# Patient Record
Sex: MALE | Race: WHITE | NOT HISPANIC OR LATINO | Employment: FULL TIME | URBAN - NONMETROPOLITAN AREA
[De-identification: names, ages, dates, MRNs, and addresses within clinical notes are randomized per-mention and may not be internally consistent; named-entity substitution may affect disease eponyms.]

---

## 2021-09-14 ENCOUNTER — HOSPITAL ENCOUNTER (EMERGENCY)
Facility: HOSPITAL | Age: 56
Discharge: HOME | End: 2021-09-15
Attending: EMERGENCY MEDICINE | Admitting: EMERGENCY MEDICINE

## 2021-09-14 DIAGNOSIS — R03.0 ELEVATED BLOOD PRESSURE READING: Primary | ICD-10-CM

## 2021-09-14 PROCEDURE — 85025 COMPLETE CBC W/AUTO DIFF WBC: CPT | Performed by: EMERGENCY MEDICINE

## 2021-09-14 PROCEDURE — 99283 EMERGENCY DEPT VISIT LOW MDM: CPT

## 2021-09-14 PROCEDURE — 93005 ELECTROCARDIOGRAM TRACING: CPT

## 2021-09-14 PROCEDURE — 93005 ELECTROCARDIOGRAM TRACING: CPT | Performed by: EMERGENCY MEDICINE

## 2021-09-14 PROCEDURE — 80053 COMPREHEN METABOLIC PANEL: CPT | Performed by: EMERGENCY MEDICINE

## 2021-09-14 RX ORDER — SODIUM CHLORIDE 0.9 % (FLUSH) 0.9 %
10 SYRINGE (ML) INJECTION AS NEEDED
Status: DISCONTINUED | OUTPATIENT
Start: 2021-09-14 | End: 2021-09-15 | Stop reason: HOSPADM

## 2021-09-15 VITALS
WEIGHT: 210 LBS | RESPIRATION RATE: 16 BRPM | BODY MASS INDEX: 26.95 KG/M2 | DIASTOLIC BLOOD PRESSURE: 101 MMHG | TEMPERATURE: 97.8 F | OXYGEN SATURATION: 98 % | HEIGHT: 74 IN | HEART RATE: 61 BPM | SYSTOLIC BLOOD PRESSURE: 149 MMHG

## 2021-09-15 LAB
ALBUMIN SERPL-MCNC: 5.1 G/DL (ref 3.5–5.2)
ALBUMIN/GLOB SERPL: 2 G/DL
ALP SERPL-CCNC: 77 U/L (ref 39–117)
ALT SERPL W P-5'-P-CCNC: 18 U/L (ref 1–41)
ANION GAP SERPL CALCULATED.3IONS-SCNC: 10.7 MMOL/L (ref 5–15)
AST SERPL-CCNC: 23 U/L (ref 1–40)
BASOPHILS # BLD AUTO: 0.1 10*3/MM3 (ref 0–0.2)
BASOPHILS NFR BLD AUTO: 1.5 % (ref 0–1.5)
BILIRUB SERPL-MCNC: 0.6 MG/DL (ref 0–1.2)
BUN SERPL-MCNC: 11 MG/DL (ref 6–20)
BUN/CREAT SERPL: 11.3 (ref 7–25)
CALCIUM SPEC-SCNC: 9.6 MG/DL (ref 8.6–10.5)
CHLORIDE SERPL-SCNC: 102 MMOL/L (ref 98–107)
CO2 SERPL-SCNC: 27.3 MMOL/L (ref 22–29)
CREAT SERPL-MCNC: 0.97 MG/DL (ref 0.76–1.27)
DEPRECATED RDW RBC AUTO: 39.5 FL (ref 37–54)
EOSINOPHIL # BLD AUTO: 0.26 10*3/MM3 (ref 0–0.4)
EOSINOPHIL NFR BLD AUTO: 3.8 % (ref 0.3–6.2)
ERYTHROCYTE [DISTWIDTH] IN BLOOD BY AUTOMATED COUNT: 12.5 % (ref 12.3–15.4)
GFR SERPL CREATININE-BSD FRML MDRD: 79 ML/MIN/1.73
GLOBULIN UR ELPH-MCNC: 2.5 GM/DL
GLUCOSE SERPL-MCNC: 95 MG/DL (ref 65–99)
HCT VFR BLD AUTO: 44.4 % (ref 37.5–51)
HGB BLD-MCNC: 15.2 G/DL (ref 13–17.7)
HOLD SPECIMEN: NORMAL
HOLD SPECIMEN: NORMAL
IMM GRANULOCYTES # BLD AUTO: 0.02 10*3/MM3 (ref 0–0.05)
IMM GRANULOCYTES NFR BLD AUTO: 0.3 % (ref 0–0.5)
LYMPHOCYTES # BLD AUTO: 1.33 10*3/MM3 (ref 0.7–3.1)
LYMPHOCYTES NFR BLD AUTO: 19.4 % (ref 19.6–45.3)
MCH RBC QN AUTO: 29.4 PG (ref 26.6–33)
MCHC RBC AUTO-ENTMCNC: 34.2 G/DL (ref 31.5–35.7)
MCV RBC AUTO: 85.9 FL (ref 79–97)
MONOCYTES # BLD AUTO: 0.48 10*3/MM3 (ref 0.1–0.9)
MONOCYTES NFR BLD AUTO: 7 % (ref 5–12)
NEUTROPHILS NFR BLD AUTO: 4.67 10*3/MM3 (ref 1.7–7)
NEUTROPHILS NFR BLD AUTO: 68 % (ref 42.7–76)
NRBC BLD AUTO-RTO: 0 /100 WBC (ref 0–0.2)
PLATELET # BLD AUTO: 259 10*3/MM3 (ref 140–450)
PMV BLD AUTO: 9.3 FL (ref 6–12)
POTASSIUM SERPL-SCNC: 4 MMOL/L (ref 3.5–5.2)
PROT SERPL-MCNC: 7.6 G/DL (ref 6–8.5)
RBC # BLD AUTO: 5.17 10*6/MM3 (ref 4.14–5.8)
SODIUM SERPL-SCNC: 140 MMOL/L (ref 136–145)
WBC # BLD AUTO: 6.86 10*3/MM3 (ref 3.4–10.8)
WHOLE BLOOD HOLD SPECIMEN: NORMAL
WHOLE BLOOD HOLD SPECIMEN: NORMAL

## 2021-09-15 RX ORDER — LISINOPRIL 10 MG/1
10 TABLET ORAL DAILY
Qty: 30 TABLET | Refills: 0 | Status: SHIPPED | OUTPATIENT
Start: 2021-09-15 | End: 2022-05-31

## 2021-09-15 NOTE — DISCHARGE INSTRUCTIONS
Please keep a blood pressure diary.  We did send a prescription for lisinopril to your pharmacy, 1 month supply.  You should follow-up with your family doctor in a week or 2 to reevaluate your blood pressure.  You may not need the blood pressure medication if your blood pressures are back to your baseline.  Your goal is 120/80.  If they are consistently elevated take the blood pressure medication.  Your labs and EKG are reassuring here today.

## 2021-09-18 NOTE — ED PROVIDER NOTES
Subjective   History of Present Illness    Chief Complaint: Dizziness elevated blood pressure reading  History of Present Illness: 56-year-old male felt as though his blood pressure was elevated, checked it again and it was high, states he is not currently on any blood pressure medications and his blood pressure usually runs 120/80.  Onset: This evening  Duration: 2 hours prior, persistent  Exacerbating / Alleviating factors: None  Associated symptoms: None      Nurses Notes reviewed and agree, including vitals, allergies, social history and prior medical history.     REVIEW OF SYSTEMS: All systems reviewed and not pertinent unless noted.    Positive for: Dizzy, elevated blood pressure.    Negative for: Headache confusion chest pain palpitations abdominal pain  Review of Systems    Past Medical History:   Diagnosis Date   • Gallstones    • GERD (gastroesophageal reflux disease)    • Hyperlipidemia        No Known Allergies    Past Surgical History:   Procedure Laterality Date   • KNEE SURGERY Right 1979   • MANDIBLE SURGERY  2006   • TONSILLECTOMY  1986       Family History   Problem Relation Age of Onset   • Cancer Mother         lymphoma   • Heart disease Father         MI   • Hypertension Sister    • Hyperlipidemia Sister    • Hypertension Brother    • Hyperlipidemia Brother    • Stroke Paternal Grandmother        Social History     Socioeconomic History   • Marital status: Single     Spouse name: Not on file   • Number of children: Not on file   • Years of education: Not on file   • Highest education level: Not on file   Tobacco Use   • Smoking status: Never Smoker   • Smokeless tobacco: Never Used   Substance and Sexual Activity   • Alcohol use: Yes     Alcohol/week: 4.0 standard drinks     Types: 4 Standard drinks or equivalent per week   • Drug use: No   • Sexual activity: Defer           Objective   Physical Exam    CONSTITUTIONAL: Well developed, nontoxic healthy-appearing 56-year-old  male,  in no  acute distress.  VITAL SIGNS: per nursing, reviewed and noted  SKIN: exposed skin with no rashes, ulcerations or petechiae.  EYES: perrla. EOMI.  ENT: Normal voice.  Patient maintained wearing a mask throughout patient encounter due to coronavirus pandemic  RESPIRATORY:  No increased work of breathing. No retractions.  Chest clear auscultation bilaterally  CARDIOVASCULAR:  regular rate and rhythm, no murmurs.  Good Peripheral pulses. Good cap refill to extremities.   GI: Abdomen soft, nontender, normal bowel sounds. No hernia. No ascites.  MUSCULOSKELETAL:  No tenderness. Full ROM. Strength and tone grossly normal.  no spasms. no neck or back tenderness or spasm.   NEUROLOGIC: Alert, oriented x 3. No gross deficits. GCS 15.  NIH stroke scale 0.  PSYCH: appropriate affect.  : no bladder tenderness or distention, no CVA tenderness      Procedures     No attending physician procedures were performed on this patient.      ED Course  ED Course as of Sep 18 0755   Wed Sep 15, 2021   0705 EKG interpreted by me reveals sinus bradycardia rate of 58.  No ectopy no ischemic changes normal EKG    [PF]   Sat Sep 18, 2021   0753 WBC: 6.86 [PF]   0753 Hemoglobin: 15.2 [PF]   0753 Hematocrit: 44.4 [PF]   0753 Platelets: 259 [PF]   0753 Glucose: 95 [PF]   0753 BUN: 11 [PF]   0754 Creatinine: 0.97 [PF]   0754 Sodium: 140 [PF]   0754 Potassium: 4.0 [PF]   0754 Chloride: 102 [PF]   0754 CO2: 27.3 [PF]   0754 Calcium: 9.6 [PF]   0754 Total Protein: 7.6 [PF]   0754 Albumin: 5.10 [PF]   0754 ALT (SGPT): 18 [PF]   0754 AST (SGOT): 23 [PF]   0754 Alkaline Phosphatase: 77 [PF]   0754 Total Bilirubin: 0.6 [PF]      ED Course User Index  [PF] Jemal Hatfield, DO                                           MDM  56-year-old male presented with elevated blood pressure readings at home, associated dizziness.  Denies any chest pain at any time.  Did have elevated blood pressure reading 188/110 on arrival here.  No tachycardia no hypoxia.  Blood  pressure improved spontaneously 149/101.  Patient declined head CT imaging.    Prescription lisinopril, advised blood pressure diary outpatient follow-up return precautions were discussed.    Final diagnoses:   Elevated blood pressure reading       ED Disposition  ED Disposition     ED Disposition Condition Comment    Discharge Stable           Whitney Soto MD  [address] [facility] [place] [address] [phone]          [facility] Emergency Department  [address] [facility] [address] [phone]    As needed, If symptoms worsen         Medication List      New Prescriptions    lisinopril 10 MG tablet  Commonly known as: PRINIVIL,ZESTRIL  Take 1 tablet by mouth Daily.           Where to Get Your Medications      These medications were sent to [facility] [address] [place] [address] - [address] [facility] - [phone]  - [phone] [address] [address]    Phone: [phone]   · lisinopril 10 MG tablet          Jemal Hatfield DO  09/18/21 0755

## 2021-09-27 ENCOUNTER — HOSPITAL ENCOUNTER (EMERGENCY)
Facility: HOSPITAL | Age: 56
Discharge: HOME | End: 2021-09-28
Attending: EMERGENCY MEDICINE | Admitting: EMERGENCY MEDICINE

## 2021-09-27 DIAGNOSIS — I10 HYPERTENSION, UNSPECIFIED TYPE: Primary | ICD-10-CM

## 2021-09-27 DIAGNOSIS — R07.89 CHEST TIGHTNESS: ICD-10-CM

## 2021-09-27 DIAGNOSIS — R10.84 GENERALIZED ABDOMINAL PAIN: ICD-10-CM

## 2021-09-27 LAB
BASOPHILS # BLD AUTO: 0.09 10*3/MM3 (ref 0–0.2)
BASOPHILS NFR BLD AUTO: 1.2 % (ref 0–1.5)
BILIRUB UR QL STRIP: NEGATIVE
CLARITY UR: CLEAR
COLOR UR: YELLOW
DEPRECATED RDW RBC AUTO: 38.9 FL (ref 37–54)
EOSINOPHIL # BLD AUTO: 0.23 10*3/MM3 (ref 0–0.4)
EOSINOPHIL NFR BLD AUTO: 3.1 % (ref 0.3–6.2)
ERYTHROCYTE [DISTWIDTH] IN BLOOD BY AUTOMATED COUNT: 12.3 % (ref 12.3–15.4)
GLUCOSE UR STRIP-MCNC: NEGATIVE MG/DL
HCT VFR BLD AUTO: 43.9 % (ref 37.5–51)
HGB BLD-MCNC: 15.1 G/DL (ref 13–17.7)
HGB UR QL STRIP.AUTO: NEGATIVE
IMM GRANULOCYTES # BLD AUTO: 0.03 10*3/MM3 (ref 0–0.05)
IMM GRANULOCYTES NFR BLD AUTO: 0.4 % (ref 0–0.5)
KETONES UR QL STRIP: NEGATIVE
LEUKOCYTE ESTERASE UR QL STRIP.AUTO: NEGATIVE
LYMPHOCYTES # BLD AUTO: 1.41 10*3/MM3 (ref 0.7–3.1)
LYMPHOCYTES NFR BLD AUTO: 19.1 % (ref 19.6–45.3)
MCH RBC QN AUTO: 29.7 PG (ref 26.6–33)
MCHC RBC AUTO-ENTMCNC: 34.4 G/DL (ref 31.5–35.7)
MCV RBC AUTO: 86.2 FL (ref 79–97)
MONOCYTES # BLD AUTO: 0.58 10*3/MM3 (ref 0.1–0.9)
MONOCYTES NFR BLD AUTO: 7.8 % (ref 5–12)
NEUTROPHILS NFR BLD AUTO: 5.06 10*3/MM3 (ref 1.7–7)
NEUTROPHILS NFR BLD AUTO: 68.4 % (ref 42.7–76)
NITRITE UR QL STRIP: NEGATIVE
NRBC BLD AUTO-RTO: 0 /100 WBC (ref 0–0.2)
PH UR STRIP.AUTO: >=9 [PH] (ref 5–8)
PLATELET # BLD AUTO: 272 10*3/MM3 (ref 140–450)
PMV BLD AUTO: 9.5 FL (ref 6–12)
PROT UR QL STRIP: ABNORMAL
RBC # BLD AUTO: 5.09 10*6/MM3 (ref 4.14–5.8)
SP GR UR STRIP: 1.02 (ref 1–1.03)
UROBILINOGEN UR QL STRIP: ABNORMAL
WBC # BLD AUTO: 7.4 10*3/MM3 (ref 3.4–10.8)

## 2021-09-27 PROCEDURE — 84484 ASSAY OF TROPONIN QUANT: CPT | Performed by: EMERGENCY MEDICINE

## 2021-09-27 PROCEDURE — 99283 EMERGENCY DEPT VISIT LOW MDM: CPT

## 2021-09-27 PROCEDURE — 81003 URINALYSIS AUTO W/O SCOPE: CPT | Performed by: EMERGENCY MEDICINE

## 2021-09-27 PROCEDURE — 36415 COLL VENOUS BLD VENIPUNCTURE: CPT

## 2021-09-27 PROCEDURE — 80053 COMPREHEN METABOLIC PANEL: CPT | Performed by: EMERGENCY MEDICINE

## 2021-09-27 PROCEDURE — 85025 COMPLETE CBC W/AUTO DIFF WBC: CPT | Performed by: EMERGENCY MEDICINE

## 2021-09-27 PROCEDURE — 83690 ASSAY OF LIPASE: CPT | Performed by: EMERGENCY MEDICINE

## 2021-09-27 PROCEDURE — 93005 ELECTROCARDIOGRAM TRACING: CPT | Performed by: EMERGENCY MEDICINE

## 2021-09-28 VITALS
DIASTOLIC BLOOD PRESSURE: 83 MMHG | WEIGHT: 205 LBS | OXYGEN SATURATION: 96 % | HEART RATE: 75 BPM | RESPIRATION RATE: 18 BRPM | SYSTOLIC BLOOD PRESSURE: 132 MMHG | HEIGHT: 74 IN | TEMPERATURE: 98.1 F | BODY MASS INDEX: 26.31 KG/M2

## 2021-09-28 LAB
ALBUMIN SERPL-MCNC: 5.1 G/DL (ref 3.5–5.2)
ALBUMIN/GLOB SERPL: 2.4 G/DL
ALP SERPL-CCNC: 72 U/L (ref 39–117)
ALT SERPL W P-5'-P-CCNC: 15 U/L (ref 1–41)
ANION GAP SERPL CALCULATED.3IONS-SCNC: 10.2 MMOL/L (ref 5–15)
AST SERPL-CCNC: 21 U/L (ref 1–40)
BILIRUB SERPL-MCNC: 0.8 MG/DL (ref 0–1.2)
BUN SERPL-MCNC: 15 MG/DL (ref 6–20)
BUN/CREAT SERPL: 14.6 (ref 7–25)
CALCIUM SPEC-SCNC: 9.6 MG/DL (ref 8.6–10.5)
CHLORIDE SERPL-SCNC: 103 MMOL/L (ref 98–107)
CO2 SERPL-SCNC: 29.8 MMOL/L (ref 22–29)
CREAT SERPL-MCNC: 1.03 MG/DL (ref 0.76–1.27)
GFR SERPL CREATININE-BSD FRML MDRD: 74 ML/MIN/1.73
GLOBULIN UR ELPH-MCNC: 2.1 GM/DL
GLUCOSE SERPL-MCNC: 126 MG/DL (ref 65–99)
LIPASE SERPL-CCNC: 63 U/L (ref 13–60)
POTASSIUM SERPL-SCNC: 4.2 MMOL/L (ref 3.5–5.2)
PROT SERPL-MCNC: 7.2 G/DL (ref 6–8.5)
SODIUM SERPL-SCNC: 143 MMOL/L (ref 136–145)
TROPONIN T SERPL-MCNC: <0.01 NG/ML (ref 0–0.03)

## 2021-10-12 ENCOUNTER — TRANSCRIBE ORDERS (OUTPATIENT)
Dept: ADMINISTRATIVE | Facility: HOSPITAL | Age: 56
End: 2021-10-12

## 2021-10-12 DIAGNOSIS — K40.90 INGUINAL HERNIA, RIGHT: Primary | ICD-10-CM

## 2021-10-14 ENCOUNTER — HOSPITAL ENCOUNTER (OUTPATIENT)
Dept: ULTRASOUND IMAGING | Facility: HOSPITAL | Age: 56
Discharge: HOME | End: 2021-10-14
Admitting: SURGERY

## 2021-10-14 DIAGNOSIS — K40.90 INGUINAL HERNIA, RIGHT: ICD-10-CM

## 2021-10-14 PROCEDURE — 76882 US LMTD JT/FCL EVL NVASC XTR: CPT

## 2021-10-25 ENCOUNTER — TRANSCRIBE ORDERS (OUTPATIENT)
Dept: LAB | Facility: HOSPITAL | Age: 56
End: 2021-10-25

## 2021-10-25 ENCOUNTER — OFFICE VISIT (OUTPATIENT)
Dept: GASTROENTEROLOGY | Facility: CLINIC | Age: 56
End: 2021-10-25

## 2021-10-25 ENCOUNTER — LAB (OUTPATIENT)
Dept: LAB | Facility: HOSPITAL | Age: 56
End: 2021-10-25

## 2021-10-25 VITALS
BODY MASS INDEX: 25.15 KG/M2 | HEIGHT: 74 IN | TEMPERATURE: 97.5 F | SYSTOLIC BLOOD PRESSURE: 127 MMHG | RESPIRATION RATE: 16 BRPM | DIASTOLIC BLOOD PRESSURE: 78 MMHG | WEIGHT: 196 LBS | HEART RATE: 65 BPM

## 2021-10-25 DIAGNOSIS — K21.9 GASTROESOPHAGEAL REFLUX DISEASE WITHOUT ESOPHAGITIS: Chronic | ICD-10-CM

## 2021-10-25 DIAGNOSIS — Z01.818 PRE-OPERATIVE CLEARANCE: ICD-10-CM

## 2021-10-25 DIAGNOSIS — Z11.59 ENCOUNTER FOR SCREENING FOR OTHER VIRAL DISEASES: ICD-10-CM

## 2021-10-25 DIAGNOSIS — K80.20 GALLSTONES: Chronic | ICD-10-CM

## 2021-10-25 DIAGNOSIS — R10.13 EPIGASTRIC PAIN: Chronic | ICD-10-CM

## 2021-10-25 DIAGNOSIS — Z86.010 PERSONAL HISTORY OF COLONIC POLYPS: Primary | ICD-10-CM

## 2021-10-25 DIAGNOSIS — Z01.818 PRE-OPERATIVE CLEARANCE: Primary | ICD-10-CM

## 2021-10-25 PROBLEM — Z86.0100 PERSONAL HISTORY OF COLONIC POLYPS: Status: ACTIVE | Noted: 2021-10-25

## 2021-10-25 PROBLEM — I10 HYPERTENSIVE DISORDER: Status: ACTIVE | Noted: 2021-10-04

## 2021-10-25 LAB — SARS-COV-2 RNA PNL SPEC NAA+PROBE: NOT DETECTED

## 2021-10-25 PROCEDURE — C9803 HOPD COVID-19 SPEC COLLECT: HCPCS

## 2021-10-25 PROCEDURE — 99204 OFFICE O/P NEW MOD 45 MIN: CPT | Performed by: NURSE PRACTITIONER

## 2021-10-25 PROCEDURE — U0004 COV-19 TEST NON-CDC HGH THRU: HCPCS

## 2021-10-25 RX ORDER — FAMOTIDINE 20 MG/1
20 TABLET, FILM COATED ORAL AS NEEDED
COMMUNITY
End: 2022-05-31 | Stop reason: SDUPTHER

## 2021-10-25 RX ORDER — SODIUM CHLORIDE 9 MG/ML
70 INJECTION, SOLUTION INTRAVENOUS CONTINUOUS PRN
Status: CANCELLED | OUTPATIENT
Start: 2021-10-25

## 2021-10-25 RX ORDER — ASPIRIN 325 MG
325 TABLET ORAL AS NEEDED
COMMUNITY

## 2021-10-25 RX ORDER — OMEPRAZOLE 20 MG/1
20 CAPSULE, DELAYED RELEASE ORAL DAILY
COMMUNITY
End: 2022-05-31 | Stop reason: SDUPTHER

## 2021-10-25 NOTE — PATIENT INSTRUCTIONS
Antireflux measures: Avoid fried, fatty foods, alcohol, chocolate, coffee, tea,  soft drinks, peppermint and spearmint, spicy foods, tomatoes and tomato based foods, onions, peppers, and smoking.   Other antireflux measures include weight reduction if overweight, avoiding tight clothing around the abdomen, elevating the head of the bed 6 inches with blocks under the head board, and don't drink or eat before going to bed and avoid lying down immediately after meals.  Omeprazole 20 mg 1 by mouth in the am 30 minutes before breakfast.   Colonoscopy: The indications, technique, alternatives and potential risk and complications were discussed with the patient including but not limited to bleeding, perforations, missing lesions and anesthetic complications. The patient understands and wishes to proceed with the procedure and has given their verbal consent. Written patient education information was given to the patient.   The patient will call if they have further questions before procedure.

## 2021-10-25 NOTE — PROGRESS NOTES
New Patient Consult      Date: 10/25/2021   Patient Name: Benedict A Enzweiler  MRN: [id number]  : 1965     Primary Care Provider: Whitney Soto MD    Chief Complaint   Patient presents with   • Colon Cancer Screening     History of Present Illness: Benedict A Enzweiler is a 56 y.o. male who is here today to establish care with Gastroenterology for colon cancer screening.     The patient denies constipation, diarrhea or rectal bleeding. He has a history of epigastric pain off and on for several years that been occurring more frequently than in the past. He has the pain after he eats that will radiate to his back. He occasionally wakes at night due to discomfort in the epigastric area.  Eating fatty foods make his pain worse. No significant nausea.     He has a history of reflux for a few years. Every time he eats he has reflux. He denies reflux at night. He is taking Omeprazole 20 mg daily with reasonable control typically. If reflux is severe, he takes a Pepcid as needed, which helps. No difficulty swallowing.     His last colonoscopy was in  with polyp removed by Dr. Mnoroe, surgical services. He has not had an EGD in the past. There is no family history of GI malignancy.     Subjective      Past Medical History:   Diagnosis Date   • Colon polyp 2016   • Gallstones    • GERD (gastroesophageal reflux disease)    • HTN (hypertension)    • Hyperlipidemia      Past Surgical History:   Procedure Laterality Date   • COLONOSCOPY  2016   • KNEE SURGERY Right    • MANDIBLE SURGERY     • TONSILLECTOMY       Family History   Problem Relation Age of Onset   • Cancer Mother         lymphoma   • Ulcers Mother    • Heart disease Father         MI   • Hypertension Sister    • Hyperlipidemia Sister    • Hypertension Brother    • Hyperlipidemia Brother    • Stroke Paternal Grandmother    • Colon cancer Neg Hx      Social History     Socioeconomic History   • Marital status: Single   Tobacco  Use   • Smoking status: Never Smoker   • Smokeless tobacco: Never Used   Vaping Use   • Vaping Use: Never used   Substance and Sexual Activity   • Alcohol use: Yes     Alcohol/week: 4.0 standard drinks     Types: 4 Standard drinks or equivalent per week     Comment: occ   • Drug use: No   • Sexual activity: Defer       Current Outpatient Medications:   •  aspirin 325 MG tablet, Take 325 mg by mouth As Needed for Mild Pain ., Disp: , Rfl:   •  famotidine (PEPCID) 20 MG tablet, Take 20 mg by mouth As Needed for Heartburn., Disp: , Rfl:   •  lisinopril (PRINIVIL,ZESTRIL) 10 MG tablet, Take 1 tablet by mouth Daily., Disp: 30 tablet, Rfl: 0  •  omeprazole (priLOSEC) 20 MG capsule, Take 20 mg by mouth Daily., Disp: , Rfl:     No Known Allergies     The following portions of the patient's history were reviewed and updated as appropriate: allergies, current medications, past family history, past medical history, past social history, past surgical history and problem list.    Objective     Physical Exam  Vitals and nursing note reviewed.   Constitutional:       General: He is not in acute distress.     Appearance: Normal appearance. He is well-developed.   HENT:      Head: Normocephalic and atraumatic.      Right Ear: Hearing normal.      Left Ear: Hearing normal.      Mouth/Throat:      Mouth: Mucous membranes are not pale, not dry and not cyanotic.   Eyes:      General: Lids are normal.      Conjunctiva/sclera: Conjunctivae normal.   Neck:      Trachea: Trachea normal.   Cardiovascular:      Rate and Rhythm: Normal rate and regular rhythm.      Heart sounds: Normal heart sounds.   Pulmonary:      Effort: Pulmonary effort is normal. No respiratory distress.      Breath sounds: Normal breath sounds.   Abdominal:      General: Bowel sounds are normal.      Palpations: Abdomen is soft. There is no mass.      Tenderness: There is no abdominal tenderness.      Hernia: No hernia is present.   Skin:     General: Skin is warm and  "dry.   Neurological:      Mental Status: He is alert and oriented to person, place, and time.   Psychiatric:         Mood and Affect: Mood normal.         Speech: Speech normal.         Behavior: Behavior normal. Behavior is cooperative.       Vitals:    10/25/21 0838   BP: 127/78   Pulse: 65   Resp: 16   Temp: 97.5 °F (36.4 °C)   Weight: 88.9 kg (196 lb)   Height: 188 cm (74\")     Body mass index is 25.16 kg/m².     Results Review:   I have reviewed the patient's new clinical and imaging results.    Admission on 09/27/2021, Discharged on 09/28/2021   Component Date Value Ref Range Status   • Glucose 09/27/2021 126* 65 - 99 mg/dL Final   • BUN 09/27/2021 15  6 - 20 mg/dL Final   • Creatinine 09/27/2021 1.03  0.76 - 1.27 mg/dL Final   • Sodium 09/27/2021 143  136 - 145 mmol/L Final   • Potassium 09/27/2021 4.2  3.5 - 5.2 mmol/L Final   • Chloride 09/27/2021 103  98 - 107 mmol/L Final   • CO2 09/27/2021 29.8* 22.0 - 29.0 mmol/L Final   • Calcium 09/27/2021 9.6  8.6 - 10.5 mg/dL Final   • Total Protein 09/27/2021 7.2  6.0 - 8.5 g/dL Final   • Albumin 09/27/2021 5.10  3.50 - 5.20 g/dL Final   • ALT (SGPT) 09/27/2021 15  1 - 41 U/L Final   • AST (SGOT) 09/27/2021 21  1 - 40 U/L Final   • Alkaline Phosphatase 09/27/2021 72  39 - 117 U/L Final   • Total Bilirubin 09/27/2021 0.8  0.0 - 1.2 mg/dL Final   • eGFR Non  Amer 09/27/2021 74  >60 mL/min/1.73 Final   • Globulin 09/27/2021 2.1  gm/dL Final   • A/G Ratio 09/27/2021 2.4  g/dL Final   • BUN/Creatinine Ratio 09/27/2021 14.6  7.0 - 25.0 Final   • Anion Gap 09/27/2021 10.2  5.0 - 15.0 mmol/L Final   • Lipase 09/27/2021 63* 13 - 60 U/L Final   • Color, UA 09/27/2021 Yellow  Yellow, Straw Final   • Appearance, UA 09/27/2021 Clear  Clear Final   • pH, UA 09/27/2021 >=9.0* 5.0 - 8.0 Final   • Specific Gravity, UA 09/27/2021 1.020  1.005 - 1.030 Final   • Glucose, UA 09/27/2021 Negative  Negative Final   • Ketones, UA 09/27/2021 Negative  Negative Final   • Bilirubin, UA " 09/27/2021 Negative  Negative Final   • Blood, UA 09/27/2021 Negative  Negative Final   • Protein, UA 09/27/2021 Trace* Negative Final   • Leuk Esterase, UA 09/27/2021 Negative  Negative Final   • Nitrite, UA 09/27/2021 Negative  Negative Final   • Urobilinogen, UA 09/27/2021 0.2 E.U./dL  0.2 - 1.0 E.U./dL Final   • Troponin T 09/27/2021 <0.010  0.000 - 0.030 ng/mL Final   • WBC 09/27/2021 7.40  3.40 - 10.80 10*3/mm3 Final   • RBC 09/27/2021 5.09  4.14 - 5.80 10*6/mm3 Final   • Hemoglobin 09/27/2021 15.1  13.0 - 17.7 g/dL Final   • Hematocrit 09/27/2021 43.9  37.5 - 51.0 % Final   • MCV 09/27/2021 86.2  79.0 - 97.0 fL Final   • MCH 09/27/2021 29.7  26.6 - 33.0 pg Final   • MCHC 09/27/2021 34.4  31.5 - 35.7 g/dL Final   • RDW 09/27/2021 12.3  12.3 - 15.4 % Final   • RDW-SD 09/27/2021 38.9  37.0 - 54.0 fl Final   • MPV 09/27/2021 9.5  6.0 - 12.0 fL Final   • Platelets 09/27/2021 272  140 - 450 10*3/mm3 Final   • Neutrophil % 09/27/2021 68.4  42.7 - 76.0 % Final   • Lymphocyte % 09/27/2021 19.1* 19.6 - 45.3 % Final   • Monocyte % 09/27/2021 7.8  5.0 - 12.0 % Final   • Eosinophil % 09/27/2021 3.1  0.3 - 6.2 % Final   • Basophil % 09/27/2021 1.2  0.0 - 1.5 % Final   • Immature Grans % 09/27/2021 0.4  0.0 - 0.5 % Final   • Neutrophils, Absolute 09/27/2021 5.06  1.70 - 7.00 10*3/mm3 Final   • Lymphocytes, Absolute 09/27/2021 1.41  0.70 - 3.10 10*3/mm3 Final   • Monocytes, Absolute 09/27/2021 0.58  0.10 - 0.90 10*3/mm3 Final   • Eosinophils, Absolute 09/27/2021 0.23  0.00 - 0.40 10*3/mm3 Final   • Basophils, Absolute 09/27/2021 0.09  0.00 - 0.20 10*3/mm3 Final   • Immature Grans, Absolute 09/27/2021 0.03  0.00 - 0.05 10*3/mm3 Final   • nRBC 09/27/2021 0.0  0.0 - 0.2 /100 WBC Final   Admission on 09/14/2021, Discharged on 09/15/2021   Component Date Value Ref Range Status   • Extra Tube 09/14/2021 Hold for add-ons.   Final    Auto resulted.   • Extra Tube 09/14/2021 hold for add-on   Final    Auto resulted   • Extra Tube  09/14/2021 Hold for add-ons.   Final    Auto resulted.   • Extra Tube 09/14/2021 hold for add-on   Final    Auto resulted   • Glucose 09/14/2021 95  65 - 99 mg/dL Final   • BUN 09/14/2021 11  6 - 20 mg/dL Final   • Creatinine 09/14/2021 0.97  0.76 - 1.27 mg/dL Final   • Sodium 09/14/2021 140  136 - 145 mmol/L Final   • Potassium 09/14/2021 4.0  3.5 - 5.2 mmol/L Final   • Chloride 09/14/2021 102  98 - 107 mmol/L Final   • CO2 09/14/2021 27.3  22.0 - 29.0 mmol/L Final   • Calcium 09/14/2021 9.6  8.6 - 10.5 mg/dL Final   • Total Protein 09/14/2021 7.6  6.0 - 8.5 g/dL Final   • Albumin 09/14/2021 5.10  3.50 - 5.20 g/dL Final   • ALT (SGPT) 09/14/2021 18  1 - 41 U/L Final   • AST (SGOT) 09/14/2021 23  1 - 40 U/L Final   • Alkaline Phosphatase 09/14/2021 77  39 - 117 U/L Final   • Total Bilirubin 09/14/2021 0.6  0.0 - 1.2 mg/dL Final   • eGFR Non  Amer 09/14/2021 79  >60 mL/min/1.73 Final   • Globulin 09/14/2021 2.5  gm/dL Final   • A/G Ratio 09/14/2021 2.0  g/dL Final   • BUN/Creatinine Ratio 09/14/2021 11.3  7.0 - 25.0 Final   • Anion Gap 09/14/2021 10.7  5.0 - 15.0 mmol/L Final   • WBC 09/14/2021 6.86  3.40 - 10.80 10*3/mm3 Final   • RBC 09/14/2021 5.17  4.14 - 5.80 10*6/mm3 Final   • Hemoglobin 09/14/2021 15.2  13.0 - 17.7 g/dL Final   • Hematocrit 09/14/2021 44.4  37.5 - 51.0 % Final   • MCV 09/14/2021 85.9  79.0 - 97.0 fL Final   • MCH 09/14/2021 29.4  26.6 - 33.0 pg Final   • MCHC 09/14/2021 34.2  31.5 - 35.7 g/dL Final   • RDW 09/14/2021 12.5  12.3 - 15.4 % Final   • RDW-SD 09/14/2021 39.5  37.0 - 54.0 fl Final   • MPV 09/14/2021 9.3  6.0 - 12.0 fL Final   • Platelets 09/14/2021 259  140 - 450 10*3/mm3 Final   • Neutrophil % 09/14/2021 68.0  42.7 - 76.0 % Final   • Lymphocyte % 09/14/2021 19.4* 19.6 - 45.3 % Final   • Monocyte % 09/14/2021 7.0  5.0 - 12.0 % Final   • Eosinophil % 09/14/2021 3.8  0.3 - 6.2 % Final   • Basophil % 09/14/2021 1.5  0.0 - 1.5 % Final   • Immature Grans % 09/14/2021 0.3  0.0 - 0.5  % Final   • Neutrophils, Absolute 09/14/2021 4.67  1.70 - 7.00 10*3/mm3 Final   • Lymphocytes, Absolute 09/14/2021 1.33  0.70 - 3.10 10*3/mm3 Final   • Monocytes, Absolute 09/14/2021 0.48  0.10 - 0.90 10*3/mm3 Final   • Eosinophils, Absolute 09/14/2021 0.26  0.00 - 0.40 10*3/mm3 Final   • Basophils, Absolute 09/14/2021 0.10  0.00 - 0.20 10*3/mm3 Final   • Immature Grans, Absolute 09/14/2021 0.02  0.00 - 0.05 10*3/mm3 Final   • nRBC 09/14/2021 0.0  0.0 - 0.2 /100 WBC Final      US Nonvascular Extremity Limited     Result Date: 10/15/2021  Normal appearing lymph node in the area of interest felt by the patient.   D:  10/15/2021 E:  10/15/2021  This report was finalized on 10/15/2021 4:23 PM by Dr. Izabela Veloz MD.      Colonoscopy per Dr. Monroe dated 9/16/2016 with polyp removed. Hepatic flexure polyp biopsy with tubular adenoma without dysplasia.    Assessment / Plan      1. Personal history of colonic polyps  Patient's last colonoscopy was in 2016 by Dr. Monroe, surgical services, with polyp removed, tubular adenoma without dysplasia.  There is no family history of colon cancer.  Colonoscopy for surveillance.    - Case Request; Standing  - Case Request    2. Epigastric pain  3. Gallstones  4. Gastroesophageal reflux disease without esophagitis  Patient has a history of epigastric pain off and on for several years that has been occurring more frequently than in the past.  He has the pain after eating that radiates to his back.  He occasionally wakes at night with discomfort in the epigastric pain.  No significant nausea.  No melena. He was told several years ago he had gallstones.  He has a history of reflux for a few years that is reasonably controlled with omeprazole 20 mg daily.  Occasionally if reflux is severe, he takes Pepcid as needed.  No difficulty swallowing. Basic labs unremarkable. Lipase borderline elevated at 63, not consistent with pancreatitis.  Antireflux measures.  Omeprazole 20 mg 1 p.o.  daily.  EGD in the future if no improvement or symptoms worsen.    Patient Instructions   Antireflux measures: Avoid fried, fatty foods, alcohol, chocolate, coffee, tea,  soft drinks, peppermint and spearmint, spicy foods, tomatoes and tomato based foods, onions, peppers, and smoking.   Other antireflux measures include weight reduction if overweight, avoiding tight clothing around the abdomen, elevating the head of the bed 6 inches with blocks under the head board, and don't drink or eat before going to bed and avoid lying down immediately after meals.  Omeprazole 20 mg 1 by mouth in the am 30 minutes before breakfast.   Colonoscopy: The indications, technique, alternatives and potential risk and complications were discussed with the patient including but not limited to bleeding, perforations, missing lesions and anesthetic complications. The patient understands and wishes to proceed with the procedure and has given their verbal consent. Written patient education information was given to the patient.   The patient will call if they have further questions before procedure.      Ora Daly, APRN  10/25/2021    Please note that portions of this note may have been completed with a voice recognition program. Efforts were made to edit the dictations, but occasionally words are mistranscribed.

## 2021-10-27 ENCOUNTER — LAB REQUISITION (OUTPATIENT)
Dept: LAB | Facility: HOSPITAL | Age: 56
End: 2021-10-27

## 2021-10-27 DIAGNOSIS — Z01.818 ENCOUNTER FOR OTHER PREPROCEDURAL EXAMINATION: ICD-10-CM

## 2021-10-27 DIAGNOSIS — K80.20 CALCULUS OF GALLBLADDER WITHOUT CHOLECYSTITIS WITHOUT OBSTRUCTION: ICD-10-CM

## 2021-10-27 LAB
ALBUMIN SERPL-MCNC: 4.8 G/DL (ref 3.5–5.2)
ALBUMIN/GLOB SERPL: 1.8 G/DL
ALP SERPL-CCNC: 88 U/L (ref 39–117)
ALT SERPL W P-5'-P-CCNC: 24 U/L (ref 1–41)
ANION GAP SERPL CALCULATED.3IONS-SCNC: 12 MMOL/L (ref 5–15)
AST SERPL-CCNC: 22 U/L (ref 1–40)
BASOPHILS # BLD AUTO: 0.08 10*3/MM3 (ref 0–0.2)
BASOPHILS NFR BLD AUTO: 1.3 % (ref 0–1.5)
BILIRUB SERPL-MCNC: 0.7 MG/DL (ref 0–1.2)
BUN SERPL-MCNC: 16 MG/DL (ref 6–20)
BUN/CREAT SERPL: 17.6 (ref 7–25)
CALCIUM SPEC-SCNC: 9.8 MG/DL (ref 8.6–10.5)
CHLORIDE SERPL-SCNC: 102 MMOL/L (ref 98–107)
CO2 SERPL-SCNC: 26 MMOL/L (ref 22–29)
CREAT SERPL-MCNC: 0.91 MG/DL (ref 0.76–1.27)
DEPRECATED RDW RBC AUTO: 39.2 FL (ref 37–54)
EOSINOPHIL # BLD AUTO: 0.39 10*3/MM3 (ref 0–0.4)
EOSINOPHIL NFR BLD AUTO: 6.2 % (ref 0.3–6.2)
ERYTHROCYTE [DISTWIDTH] IN BLOOD BY AUTOMATED COUNT: 12.2 % (ref 12.3–15.4)
GFR SERPL CREATININE-BSD FRML MDRD: 86 ML/MIN/1.73
GLOBULIN UR ELPH-MCNC: 2.6 GM/DL
GLUCOSE SERPL-MCNC: 146 MG/DL (ref 65–99)
HCT VFR BLD AUTO: 44.3 % (ref 37.5–51)
HGB BLD-MCNC: 14.8 G/DL (ref 13–17.7)
IMM GRANULOCYTES # BLD AUTO: 0.03 10*3/MM3 (ref 0–0.05)
IMM GRANULOCYTES NFR BLD AUTO: 0.5 % (ref 0–0.5)
LYMPHOCYTES # BLD AUTO: 1.35 10*3/MM3 (ref 0.7–3.1)
LYMPHOCYTES NFR BLD AUTO: 21.6 % (ref 19.6–45.3)
MCH RBC QN AUTO: 29.1 PG (ref 26.6–33)
MCHC RBC AUTO-ENTMCNC: 33.4 G/DL (ref 31.5–35.7)
MCV RBC AUTO: 87 FL (ref 79–97)
MONOCYTES # BLD AUTO: 0.51 10*3/MM3 (ref 0.1–0.9)
MONOCYTES NFR BLD AUTO: 8.1 % (ref 5–12)
NEUTROPHILS NFR BLD AUTO: 3.9 10*3/MM3 (ref 1.7–7)
NEUTROPHILS NFR BLD AUTO: 62.3 % (ref 42.7–76)
NRBC BLD AUTO-RTO: 0 /100 WBC (ref 0–0.2)
PLATELET # BLD AUTO: 248 10*3/MM3 (ref 140–450)
PMV BLD AUTO: 10.3 FL (ref 6–12)
POTASSIUM SERPL-SCNC: 3.7 MMOL/L (ref 3.5–5.2)
PROT SERPL-MCNC: 7.4 G/DL (ref 6–8.5)
RBC # BLD AUTO: 5.09 10*6/MM3 (ref 4.14–5.8)
SODIUM SERPL-SCNC: 140 MMOL/L (ref 136–145)
WBC # BLD AUTO: 6.26 10*3/MM3 (ref 3.4–10.8)

## 2021-10-27 PROCEDURE — 80053 COMPREHEN METABOLIC PANEL: CPT | Performed by: SURGERY

## 2021-10-27 PROCEDURE — 88304 TISSUE EXAM BY PATHOLOGIST: CPT | Performed by: SURGERY

## 2021-10-27 PROCEDURE — 85025 COMPLETE CBC W/AUTO DIFF WBC: CPT | Performed by: SURGERY

## 2021-10-28 LAB
CYTO UR: NORMAL
LAB AP CASE REPORT: NORMAL
LAB AP CLINICAL INFORMATION: NORMAL
PATH REPORT.FINAL DX SPEC: NORMAL
PATH REPORT.GROSS SPEC: NORMAL

## 2021-10-29 ENCOUNTER — APPOINTMENT (OUTPATIENT)
Dept: ULTRASOUND IMAGING | Facility: HOSPITAL | Age: 56
End: 2021-10-29

## 2021-12-01 ENCOUNTER — TELEPHONE (OUTPATIENT)
Dept: GASTROENTEROLOGY | Facility: CLINIC | Age: 56
End: 2021-12-01

## 2021-12-01 NOTE — TELEPHONE ENCOUNTER
CALLED PATIENT TO SCHEDULE COLONOSCOPY. REACHED VOICEMAIL. UNABLE TO LEAVE MESSAGE DUE TO FULL MAILBOX

## 2021-12-06 DIAGNOSIS — Z86.010 PERSONAL HISTORY OF COLONIC POLYPS: Primary | ICD-10-CM

## 2021-12-06 RX ORDER — BISACODYL 5 MG/1
TABLET, DELAYED RELEASE ORAL
Qty: 4 TABLET | Refills: 0 | Status: SHIPPED | OUTPATIENT
Start: 2021-12-06 | End: 2022-03-31 | Stop reason: HOSPADM

## 2021-12-06 RX ORDER — POLYETHYLENE GLYCOL 3350 17 G/17G
POWDER, FOR SOLUTION ORAL
Qty: 238 G | Refills: 0 | Status: SHIPPED | OUTPATIENT
Start: 2021-12-06 | End: 2022-03-31 | Stop reason: HOSPADM

## 2021-12-14 ENCOUNTER — TELEPHONE (OUTPATIENT)
Dept: GASTROENTEROLOGY | Facility: CLINIC | Age: 56
End: 2021-12-14

## 2021-12-14 NOTE — TELEPHONE ENCOUNTER
CALLED PATIENT, LIDA FULL.    Called the patient, 12/07/2021.  LIDA full.    Left message on home Number for patient to call back.  12/13/21.    Attempts to reach patient by  Phone.  MW          ----- Message from Zeb Kennedy MA sent at 12/1/2021  4:18 PM EST -----  [phone]

## 2022-02-10 ENCOUNTER — TELEPHONE (OUTPATIENT)
Dept: GASTROENTEROLOGY | Facility: CLINIC | Age: 57
End: 2022-02-10

## 2022-02-10 NOTE — TELEPHONE ENCOUNTER
Called and left message for the patient.  Procedure for 02/14/2022 cancelled due to COVID increase.  We will call patient back to R/S.

## 2022-03-19 ENCOUNTER — HOSPITAL ENCOUNTER (EMERGENCY)
Facility: HOSPITAL | Age: 57
Discharge: HOME | End: 2022-03-20
Attending: EMERGENCY MEDICINE | Admitting: EMERGENCY MEDICINE

## 2022-03-19 DIAGNOSIS — K90.49 FOOD INTOLERANCE: ICD-10-CM

## 2022-03-19 DIAGNOSIS — R03.0 ELEVATED BLOOD PRESSURE READING: Primary | ICD-10-CM

## 2022-03-19 LAB
BASOPHILS # BLD AUTO: 0.07 10*3/MM3 (ref 0–0.2)
BASOPHILS NFR BLD AUTO: 1.1 % (ref 0–1.5)
DEPRECATED RDW RBC AUTO: 39 FL (ref 37–54)
EOSINOPHIL # BLD AUTO: 0.13 10*3/MM3 (ref 0–0.4)
EOSINOPHIL NFR BLD AUTO: 2.1 % (ref 0.3–6.2)
ERYTHROCYTE [DISTWIDTH] IN BLOOD BY AUTOMATED COUNT: 12.3 % (ref 12.3–15.4)
HCT VFR BLD AUTO: 42 % (ref 37.5–51)
HGB BLD-MCNC: 14.5 G/DL (ref 13–17.7)
IMM GRANULOCYTES # BLD AUTO: 0.02 10*3/MM3 (ref 0–0.05)
IMM GRANULOCYTES NFR BLD AUTO: 0.3 % (ref 0–0.5)
LYMPHOCYTES # BLD AUTO: 1.54 10*3/MM3 (ref 0.7–3.1)
LYMPHOCYTES NFR BLD AUTO: 25.2 % (ref 19.6–45.3)
MCH RBC QN AUTO: 29.9 PG (ref 26.6–33)
MCHC RBC AUTO-ENTMCNC: 34.5 G/DL (ref 31.5–35.7)
MCV RBC AUTO: 86.6 FL (ref 79–97)
MONOCYTES # BLD AUTO: 0.58 10*3/MM3 (ref 0.1–0.9)
MONOCYTES NFR BLD AUTO: 9.5 % (ref 5–12)
NEUTROPHILS NFR BLD AUTO: 3.76 10*3/MM3 (ref 1.7–7)
NEUTROPHILS NFR BLD AUTO: 61.8 % (ref 42.7–76)
NRBC BLD AUTO-RTO: 0 /100 WBC (ref 0–0.2)
PLATELET # BLD AUTO: 246 10*3/MM3 (ref 140–450)
PMV BLD AUTO: 9.1 FL (ref 6–12)
RBC # BLD AUTO: 4.85 10*6/MM3 (ref 4.14–5.8)
WBC NRBC COR # BLD: 6.1 10*3/MM3 (ref 3.4–10.8)

## 2022-03-19 PROCEDURE — 83735 ASSAY OF MAGNESIUM: CPT | Performed by: EMERGENCY MEDICINE

## 2022-03-19 PROCEDURE — 85025 COMPLETE CBC W/AUTO DIFF WBC: CPT | Performed by: EMERGENCY MEDICINE

## 2022-03-19 PROCEDURE — 83690 ASSAY OF LIPASE: CPT | Performed by: EMERGENCY MEDICINE

## 2022-03-19 PROCEDURE — 99283 EMERGENCY DEPT VISIT LOW MDM: CPT

## 2022-03-19 PROCEDURE — 93005 ELECTROCARDIOGRAM TRACING: CPT | Performed by: EMERGENCY MEDICINE

## 2022-03-19 PROCEDURE — 80053 COMPREHEN METABOLIC PANEL: CPT | Performed by: EMERGENCY MEDICINE

## 2022-03-19 RX ORDER — SODIUM CHLORIDE 0.9 % (FLUSH) 0.9 %
10 SYRINGE (ML) INJECTION AS NEEDED
Status: DISCONTINUED | OUTPATIENT
Start: 2022-03-19 | End: 2022-03-20 | Stop reason: HOSPADM

## 2022-03-19 RX ORDER — AMLODIPINE BESYLATE 5 MG/1
10 TABLET ORAL
Status: DISCONTINUED | OUTPATIENT
Start: 2022-03-20 | End: 2022-03-20 | Stop reason: ALTCHOICE

## 2022-03-20 VITALS
HEART RATE: 76 BPM | DIASTOLIC BLOOD PRESSURE: 99 MMHG | OXYGEN SATURATION: 99 % | BODY MASS INDEX: 25.28 KG/M2 | HEIGHT: 74 IN | TEMPERATURE: 97.8 F | WEIGHT: 197 LBS | RESPIRATION RATE: 17 BRPM | SYSTOLIC BLOOD PRESSURE: 141 MMHG

## 2022-03-20 LAB
ALBUMIN SERPL-MCNC: 4.9 G/DL (ref 3.5–5.2)
ALBUMIN/GLOB SERPL: 2 G/DL
ALP SERPL-CCNC: 70 U/L (ref 39–117)
ALT SERPL W P-5'-P-CCNC: 19 U/L (ref 1–41)
ANION GAP SERPL CALCULATED.3IONS-SCNC: 11.5 MMOL/L (ref 5–15)
AST SERPL-CCNC: 32 U/L (ref 1–40)
BILIRUB SERPL-MCNC: 1.2 MG/DL (ref 0–1.2)
BUN SERPL-MCNC: 11 MG/DL (ref 6–20)
BUN/CREAT SERPL: 12 (ref 7–25)
CALCIUM SPEC-SCNC: 10.3 MG/DL (ref 8.6–10.5)
CHLORIDE SERPL-SCNC: 100 MMOL/L (ref 98–107)
CO2 SERPL-SCNC: 27.5 MMOL/L (ref 22–29)
CREAT SERPL-MCNC: 0.92 MG/DL (ref 0.76–1.27)
EGFRCR SERPLBLD CKD-EPI 2021: 96.4 ML/MIN/1.73
GLOBULIN UR ELPH-MCNC: 2.4 GM/DL
GLUCOSE SERPL-MCNC: 102 MG/DL (ref 65–99)
LIPASE SERPL-CCNC: 62 U/L (ref 13–60)
MAGNESIUM SERPL-MCNC: 2.3 MG/DL (ref 1.6–2.6)
POTASSIUM SERPL-SCNC: 3.7 MMOL/L (ref 3.5–5.2)
PROT SERPL-MCNC: 7.3 G/DL (ref 6–8.5)
SODIUM SERPL-SCNC: 139 MMOL/L (ref 136–145)

## 2022-03-20 RX ORDER — AMLODIPINE BESYLATE 5 MG/1
10 TABLET ORAL ONCE
Status: COMPLETED | OUTPATIENT
Start: 2022-03-20 | End: 2022-03-20

## 2022-03-20 RX ORDER — AMLODIPINE BESYLATE 5 MG/1
5 TABLET ORAL DAILY
Qty: 30 TABLET | Refills: 0 | Status: SHIPPED | OUTPATIENT
Start: 2022-03-20 | End: 2022-03-29

## 2022-03-20 RX ADMIN — AMLODIPINE BESYLATE 10 MG: 5 TABLET ORAL at 01:14

## 2022-03-20 NOTE — DISCHARGE INSTRUCTIONS
Consider gaviscon over the counter 4 times daily as directed as needed for gi discomfort.     Take omeprazole once daily for at least 2 week trial.

## 2022-03-24 NOTE — ED PROVIDER NOTES
Subjective   History of Present Illness    Chief Complaint: Elevated blood pressure reading,  History of Present Illness: 56-year-old male recently increase his blood pressure losartan.  States that he can tell when his blood pressure is up as he feels lightheaded.  No headache no blurry vision no chest pain.  Does have some association of increased blood pressure and epigastric pain after eating.  This has been ongoing over the last few weeks, suspects it is contributing to his blood pressure being elevated due to pain.  His blood pressure has been well controlled prior  Onset: Gradually  Duration: Intermittent recurrent  Exacerbating / Alleviating factors: Recent increase of losartan, persistent evaluation of blood pressures, has pain after eating  Associated symptoms: None      Nurses Notes reviewed and agree, including vitals, allergies, social history and prior medical history.     REVIEW OF SYSTEMS: All systems reviewed and not pertinent unless noted.    Positive for: Elevated blood pressure readings, feels  unwell when blood pressure is elevated.  Pain after eating with eructations    Negative for: Fever headache neck pain vision changes blurry vision, chest pain palpitations  Review of Systems    Past Medical History:   Diagnosis Date   • Colon polyp 09/16/2016   • Gallstones    • GERD (gastroesophageal reflux disease)    • HTN (hypertension)    • Hyperlipidemia        No Known Allergies    Past Surgical History:   Procedure Laterality Date   • COLONOSCOPY  09/16/2016   • KNEE SURGERY Right 1979   • MANDIBLE SURGERY  2006   • TONSILLECTOMY  1986       Family History   Problem Relation Age of Onset   • Cancer Mother         lymphoma   • Ulcers Mother    • Heart disease Father         MI   • Hypertension Sister    • Hyperlipidemia Sister    • Hypertension Brother    • Hyperlipidemia Brother    • Stroke Paternal Grandmother    • Colon cancer Neg Hx        Social History     Socioeconomic History   • Marital  status: Single   Tobacco Use   • Smoking status: Never Smoker   • Smokeless tobacco: Never Used   Vaping Use   • Vaping Use: Never used   Substance and Sexual Activity   • Alcohol use: Yes     Alcohol/week: 4.0 standard drinks     Types: 4 Standard drinks or equivalent per week     Comment: occ   • Drug use: No   • Sexual activity: Defer           Objective   Physical Exam    CONSTITUTIONAL: Well developed, nontoxic healthy-appearing 56-year-old male,  in no acute distress.  VITAL SIGNS: per nursing, reviewed and noted  SKIN: exposed skin with no rashes, ulcerations or petechiae.  EYES: perrla. EOMI.  ENT: Normal voice.  Patient maintained wearing a mask throughout patient encounter due to coronavirus pandemic  RESPIRATORY:  No increased work of breathing. No retractions.   CARDIOVASCULAR:  regular rate and rhythm, no murmurs.  Good Peripheral pulses. Good cap refill to extremities.   GI: Abdomen soft, nontender, normal bowel sounds. No hernia. No ascites.  MUSCULOSKELETAL:  No tenderness. Full ROM. Strength and tone grossly normal.  no spasms. no neck or back tenderness or spasm.   NEUROLOGIC: Alert, oriented x 3. No gross deficits. GCS 15.   PSYCH: appropriate affect.  : no bladder tenderness or distention, no CVA tenderness      Procedures     No attending physician procedures were performed on this patient.      ED Course  ED Course as of 03/24/22 1743   Sun Mar 20, 2022   0037 EKG interpreted by me reveals sinus bradycardia rate 56.  No ectopy no ischemic changes [PF]   u Mar 24, 2022   1741 Lipase(!): 62 [PF]   1741 Magnesium: 2.3 [PF]   1741 Glucose(!): 102 [PF]   1741 Creatinine: 0.92 [PF]   1741 BUN: 11 [PF]   1741 Sodium: 139 [PF]   1741 Potassium: 3.7 [PF]   1741 Chloride: 100 [PF]   1741 CO2: 27.5 [PF]   1741 Calcium: 10.3 [PF]   1741 Total Protein: 7.3 [PF]   1741 Albumin: 4.90 [PF]   1741 ALT (SGPT): 19 [PF]   1741 AST (SGOT): 32 [PF]   1741 Alkaline Phosphatase: 70 [PF]   1741 Total Bilirubin: 1.2  [PF]   1741 WBC: 6.10 [PF]   1741 Hemoglobin: 14.5 [PF]   1741 Hematocrit: 42.0 [PF]   1741 Platelets: 246 [PF]      ED Course User Index  [PF] Jemal Hatfield,                                                  MDM  56-year-old male presents with above complaints nonischemic EKG no evidence of ACS.  Patient does report some pain with eating.  Advised daily PPI, dietary restrictions, addition of Norvasc for elevated blood pressure readings to have on hand if persistent elevation of blood pressures.  Outpatient follow-up with GI for possible endoscopy, return precautions discussed.  Final diagnoses:   Elevated blood pressure reading   Food intolerance       ED Disposition  ED Disposition     ED Disposition   Discharge    Condition   Stable    Comment   --             Whitney Soto MD  [address] [facility]  [address]  [place] [id number] [phone]          [facility] Emergency Department  [address] [facility] [address] [phone]    As needed, If symptoms worsen    Carlos Shore MD  [address] [facility] [address]  [address]  [place] [id number] [phone]      for follow up         Medication List      New Prescriptions    amLODIPine 5 MG tablet  Commonly known as: NORVASC  Take 1 tablet by mouth Daily.           Where to Get Your Medications      These medications were sent to [facility] [address] [place] [address] - [address] [place], [place] [id number] AT [facility] - [phone]  - [phone] FX  [address] [facility] [address]    Phone: [phone]   · amLODIPine 5 MG tablet          Jemal Hatfield DO  03/24/22 1749

## 2022-03-28 ENCOUNTER — PREP FOR SURGERY (OUTPATIENT)
Dept: OTHER | Facility: HOSPITAL | Age: 57
End: 2022-03-28

## 2022-03-28 DIAGNOSIS — K21.9 GASTROESOPHAGEAL REFLUX DISEASE WITHOUT ESOPHAGITIS: ICD-10-CM

## 2022-03-28 DIAGNOSIS — Z86.010 PERSONAL HISTORY OF COLONIC POLYPS: Primary | ICD-10-CM

## 2022-03-28 RX ORDER — SODIUM CHLORIDE 9 MG/ML
70 INJECTION, SOLUTION INTRAVENOUS CONTINUOUS PRN
Status: CANCELLED | OUTPATIENT
Start: 2022-03-28

## 2022-03-29 ENCOUNTER — LAB (OUTPATIENT)
Dept: LAB | Facility: HOSPITAL | Age: 57
End: 2022-03-29

## 2022-03-29 DIAGNOSIS — Z01.812 PRE-PROCEDURE LAB EXAM: ICD-10-CM

## 2022-03-29 DIAGNOSIS — Z01.812 PRE-PROCEDURE LAB EXAM: Primary | ICD-10-CM

## 2022-03-29 PROCEDURE — U0004 COV-19 TEST NON-CDC HGH THRU: HCPCS

## 2022-03-29 PROCEDURE — C9803 HOPD COVID-19 SPEC COLLECT: HCPCS

## 2022-03-29 RX ORDER — LOSARTAN POTASSIUM 50 MG/1
50 TABLET ORAL DAILY
COMMUNITY

## 2022-03-30 LAB — SARS-COV-2 RNA NOSE QL NAA+PROBE: NOT DETECTED

## 2022-03-31 ENCOUNTER — HOSPITAL ENCOUNTER (OUTPATIENT)
Facility: HOSPITAL | Age: 57
Setting detail: HOSPITAL OUTPATIENT SURGERY
Discharge: HOME | End: 2022-03-31
Attending: INTERNAL MEDICINE | Admitting: INTERNAL MEDICINE

## 2022-03-31 ENCOUNTER — ANESTHESIA EVENT (OUTPATIENT)
Dept: GASTROENTEROLOGY | Facility: HOSPITAL | Age: 57
End: 2022-03-31

## 2022-03-31 ENCOUNTER — ANESTHESIA (OUTPATIENT)
Dept: GASTROENTEROLOGY | Facility: HOSPITAL | Age: 57
End: 2022-03-31

## 2022-03-31 VITALS
HEIGHT: 74 IN | HEART RATE: 58 BPM | WEIGHT: 198 LBS | BODY MASS INDEX: 25.41 KG/M2 | OXYGEN SATURATION: 99 % | RESPIRATION RATE: 18 BRPM | TEMPERATURE: 97.4 F | DIASTOLIC BLOOD PRESSURE: 98 MMHG | SYSTOLIC BLOOD PRESSURE: 133 MMHG

## 2022-03-31 DIAGNOSIS — Z86.010 PERSONAL HISTORY OF COLONIC POLYPS: ICD-10-CM

## 2022-03-31 DIAGNOSIS — K21.9 GASTROESOPHAGEAL REFLUX DISEASE WITHOUT ESOPHAGITIS: ICD-10-CM

## 2022-03-31 DIAGNOSIS — R10.13 EPIGASTRIC PAIN: Primary | ICD-10-CM

## 2022-03-31 PROCEDURE — 25010000002 PROPOFOL 10 MG/ML EMULSION: Performed by: NURSE ANESTHETIST, CERTIFIED REGISTERED

## 2022-03-31 PROCEDURE — 43239 EGD BIOPSY SINGLE/MULTIPLE: CPT | Performed by: INTERNAL MEDICINE

## 2022-03-31 PROCEDURE — 45380 COLONOSCOPY AND BIOPSY: CPT | Performed by: INTERNAL MEDICINE

## 2022-03-31 RX ORDER — SIMETHICONE 20 MG/.3ML
EMULSION ORAL AS NEEDED
Status: DISCONTINUED | OUTPATIENT
Start: 2022-03-31 | End: 2022-03-31 | Stop reason: HOSPADM

## 2022-03-31 RX ORDER — ONDANSETRON 2 MG/ML
4 INJECTION INTRAMUSCULAR; INTRAVENOUS ONCE AS NEEDED
Status: CANCELLED | OUTPATIENT
Start: 2022-03-31 | End: 2022-03-31

## 2022-03-31 RX ORDER — LIDOCAINE HYDROCHLORIDE 20 MG/ML
INJECTION, SOLUTION INTRAVENOUS AS NEEDED
Status: DISCONTINUED | OUTPATIENT
Start: 2022-03-31 | End: 2022-03-31 | Stop reason: SURG

## 2022-03-31 RX ORDER — KETAMINE HYDROCHLORIDE 50 MG/ML
INJECTION, SOLUTION, CONCENTRATE INTRAMUSCULAR; INTRAVENOUS AS NEEDED
Status: DISCONTINUED | OUTPATIENT
Start: 2022-03-31 | End: 2022-03-31 | Stop reason: SURG

## 2022-03-31 RX ORDER — SODIUM CHLORIDE 9 MG/ML
70 INJECTION, SOLUTION INTRAVENOUS CONTINUOUS PRN
Status: DISCONTINUED | OUTPATIENT
Start: 2022-03-31 | End: 2022-03-31 | Stop reason: HOSPADM

## 2022-03-31 RX ORDER — PROPOFOL 10 MG/ML
VIAL (ML) INTRAVENOUS AS NEEDED
Status: DISCONTINUED | OUTPATIENT
Start: 2022-03-31 | End: 2022-03-31 | Stop reason: SURG

## 2022-03-31 RX ADMIN — KETAMINE HYDROCHLORIDE 50 MG: 50 INJECTION, SOLUTION INTRAMUSCULAR; INTRAVENOUS at 09:34

## 2022-03-31 RX ADMIN — SODIUM CHLORIDE 70 ML/HR: 9 INJECTION, SOLUTION INTRAVENOUS at 09:02

## 2022-03-31 RX ADMIN — PROPOFOL 500 MG: 10 INJECTION, EMULSION INTRAVENOUS at 10:05

## 2022-03-31 RX ADMIN — LIDOCAINE HYDROCHLORIDE 100 MG: 20 INJECTION, SOLUTION INTRAVENOUS at 09:34

## 2022-03-31 NOTE — ANESTHESIA POSTPROCEDURE EVALUATION
Patient: Benedict A Enzweiler    Procedure Summary     Date: 03/31/22 Room / Location: [facility] ENDOSCOPY 2 / [facility] ENDOSCOPY    Anesthesia Start: 0930 Anesthesia Stop: 1011    Procedures:       ESOPHAGOGASTRODUODENOSCOPY WITH BIOPSIES (N/A Esophagus)      COLONOSCOPY WITH POLYPECTOMY X1 (N/A Anus) Diagnosis:       Personal history of colonic polyps      Gastroesophageal reflux disease without esophagitis      (Personal history of colonic polyps [Z86.010])      (Gastroesophageal reflux disease without esophagitis [K21.9])    Surgeons: Carlos Shore MD Provider: Dung Jones CRNA    Anesthesia Type: MAC ASA Status: 3          Anesthesia Type: MAC    Vitals  Vitals Value Taken Time   BP 97/68 03/31/22 1011   Temp 97.4 °F (36.3 °C) 03/31/22 1011   Pulse 71 03/31/22 1011   Resp 18 03/31/22 1011   SpO2 95 % 03/31/22 1011           Post Anesthesia Care and Evaluation    Patient location during evaluation: PHASE II  Patient participation: complete - patient participated  Level of consciousness: awake  Pain score: 1  Pain management: adequate  Airway patency: patent  Anesthetic complications: No anesthetic complications  PONV Status: controlled  Cardiovascular status: acceptable and stable  Respiratory status: acceptable  Hydration status: acceptable

## 2022-03-31 NOTE — ANESTHESIA PREPROCEDURE EVALUATION
Anesthesia Evaluation     Patient summary reviewed and Nursing notes reviewed   no history of anesthetic complications:  NPO Solid Status: > 8 hours  NPO Liquid Status: > 8 hours           Airway   Mallampati: II  TM distance: >3 FB  Possible difficult intubation and No difficulty expected  Dental      Pulmonary    (+) decreased breath sounds,   (-) not a smoker  Cardiovascular     PT is on anticoagulation therapy    (+) hypertension, hyperlipidemia,       Neuro/Psych  GI/Hepatic/Renal/Endo    (+)  GERD,      Musculoskeletal     (+) arthralgias, back pain, chronic pain, myalgias,   Abdominal   (+) obese,    Substance History      OB/GYN          Other                        Anesthesia Plan    ASA 3     MAC   (Risks and benefits discussed including risk of aspiration, recall and dental damage. All patient questions answered.    Will continue with plan of care.)  intravenous induction     Anesthetic plan, all risks, benefits, and alternatives have been provided, discussed and informed consent has been obtained with: patient.        CODE STATUS:

## 2022-04-03 LAB
LAB AP CASE REPORT: NORMAL
PATH REPORT.FINAL DX SPEC: NORMAL

## 2022-04-20 ENCOUNTER — HOSPITAL ENCOUNTER (OUTPATIENT)
Dept: ULTRASOUND IMAGING | Facility: HOSPITAL | Age: 57
Discharge: HOME | End: 2022-04-20
Admitting: INTERNAL MEDICINE

## 2022-04-20 DIAGNOSIS — R10.13 EPIGASTRIC PAIN: ICD-10-CM

## 2022-04-20 PROCEDURE — 76700 US EXAM ABDOM COMPLETE: CPT

## 2022-05-31 ENCOUNTER — OFFICE VISIT (OUTPATIENT)
Dept: GASTROENTEROLOGY | Facility: CLINIC | Age: 57
End: 2022-05-31

## 2022-05-31 VITALS
BODY MASS INDEX: 24.15 KG/M2 | WEIGHT: 188.2 LBS | OXYGEN SATURATION: 98 % | HEIGHT: 74 IN | HEART RATE: 59 BPM | DIASTOLIC BLOOD PRESSURE: 74 MMHG | TEMPERATURE: 97.6 F | SYSTOLIC BLOOD PRESSURE: 122 MMHG

## 2022-05-31 DIAGNOSIS — K21.9 GASTROESOPHAGEAL REFLUX DISEASE WITHOUT ESOPHAGITIS: ICD-10-CM

## 2022-05-31 DIAGNOSIS — Z12.11 ENCOUNTER FOR SCREENING FOR MALIGNANT NEOPLASM OF COLON: ICD-10-CM

## 2022-05-31 DIAGNOSIS — R10.31 RIGHT LOWER QUADRANT ABDOMINAL PAIN: ICD-10-CM

## 2022-05-31 DIAGNOSIS — R10.13 EPIGASTRIC PAIN: Primary | ICD-10-CM

## 2022-05-31 DIAGNOSIS — R14.0 BLOATING: ICD-10-CM

## 2022-05-31 PROCEDURE — 99214 OFFICE O/P EST MOD 30 MIN: CPT | Performed by: NURSE PRACTITIONER

## 2022-05-31 RX ORDER — ESOMEPRAZOLE MAGNESIUM 40 MG/1
CAPSULE, DELAYED RELEASE ORAL
COMMUNITY
End: 2022-05-31 | Stop reason: SDUPTHER

## 2022-05-31 RX ORDER — OMEPRAZOLE 40 MG/1
40 CAPSULE, DELAYED RELEASE ORAL DAILY
COMMUNITY
End: 2022-05-31

## 2022-05-31 RX ORDER — ESOMEPRAZOLE MAGNESIUM 40 MG/1
40 CAPSULE, DELAYED RELEASE ORAL
COMMUNITY
End: 2022-08-22 | Stop reason: DRUGHIGH

## 2022-05-31 NOTE — PROGRESS NOTES
Follow Up Note     Date: 2022   Patient Name: Benedict A Enzweiler  MRN: [id number]  : 1965     Primary Care Provider: Whitney Soto MD     Chief Complaint   Patient presents with   • Follow-up     History of present illness:   2022  Benedict A Enzweiler is a 57 y.o. male who is here today for follow up after procedures.    He had his gallbladder removed, but it did not help much with his abdominal pain and bloating after eating. He is following Low FODMAP diet but it does not help. He has found several foods that seem to make his symptoms worse and he avoids them.  He was taking Omeprazole but he continued to have frequent breakthrough reflux. He was started on Nexium 40 mg, but still has breakthrough. Denies constipation, diarrhea or rectal bleeding.     Interval History:  10/25/2021  The patient denies constipation, diarrhea or rectal bleeding. He has a history of epigastric pain off and on for several years that been occurring more frequently than in the past. He has the pain after he eats that will radiate to his back. He occasionally wakes at night due to discomfort in the epigastric area.  Eating fatty foods make his pain worse. No significant nausea.     He has a history of reflux for a few years. Every time he eats he has reflux. He denies reflux at night. He is taking Omeprazole 20 mg daily with reasonable control typically. If reflux is severe, he takes a Pepcid as needed, which helps. No difficulty swallowing.      His last colonoscopy was in  with polyp removed by Dr. Monroe, surgical services. He has not had an EGD in the past. There is no family history of GI malignancy.     Subjective      Past Medical History:   Diagnosis Date   • Colon polyp 2016   • Gallstones    • GERD (gastroesophageal reflux disease)    • HTN (hypertension)    • Hx of echocardiogram    • Hyperlipidemia      Past Surgical History:   Procedure Laterality Date   • CHOLECYSTECTOMY  10/2021   •  COLONOSCOPY  09/16/2016   • COLONOSCOPY N/A 3/31/2022    Procedure: COLONOSCOPY WITH POLYPECTOMY X1;  Surgeon: Carlos Shore MD;  Location: [facility] ENDOSCOPY;  Service: Gastroenterology;  Laterality: N/A;   • ENDOSCOPY N/A 3/31/2022    Procedure: ESOPHAGOGASTRODUODENOSCOPY WITH BIOPSIES;  Surgeon: Carlos Shore MD;  Location: [facility] ENDOSCOPY;  Service: Gastroenterology;  Laterality: N/A;   • KNEE SURGERY Right 1979   • MANDIBLE SURGERY  2006   • TONSILLECTOMY  1986     Family History   Problem Relation Age of Onset   • Cancer Mother         lymphoma   • Ulcers Mother    • Heart disease Father         MI   • Hypertension Sister    • Hyperlipidemia Sister    • Hypertension Brother    • Hyperlipidemia Brother    • Stroke Paternal Grandmother    • Colon cancer Neg Hx      Social History     Socioeconomic History   • Marital status: Single   Tobacco Use   • Smoking status: Never Smoker   • Smokeless tobacco: Never Used   Vaping Use   • Vaping Use: Never used   Substance and Sexual Activity   • Alcohol use: Yes     Comment: occ   • Drug use: No   • Sexual activity: Defer       Current Outpatient Medications:   •  aspirin 325 MG tablet, Take 325 mg by mouth As Needed for Mild Pain ., Disp: , Rfl:   •  esomeprazole (nexIUM) 40 MG capsule, Take 40 mg by mouth Every Morning Before Breakfast., Disp: , Rfl:   •  losartan (COZAAR) 50 MG tablet, Take 50 mg by mouth Daily., Disp: , Rfl:      No Known Allergies     The following portions of the patient's history were reviewed and updated as appropriate: allergies, current medications, past family history, past medical history, past social history, past surgical history and problem list.  Objective     Physical Exam  Vitals and nursing note reviewed.   Constitutional:       General: He is not in acute distress.     Appearance: Normal appearance. He is well-developed.   HENT:      Head: Normocephalic and atraumatic.      Mouth/Throat:      Mouth: Mucous membranes  "are not pale, not dry and not cyanotic.   Eyes:      General: Lids are normal.   Neck:      Trachea: Trachea normal.   Cardiovascular:      Rate and Rhythm: Normal rate.   Pulmonary:      Effort: Pulmonary effort is normal. No respiratory distress.      Breath sounds: Normal breath sounds.   Abdominal:      General: Bowel sounds are normal.      Palpations: Abdomen is soft. There is no mass.      Tenderness: There is no abdominal tenderness.      Hernia: No hernia is present.   Skin:     General: Skin is warm and dry.   Neurological:      Mental Status: He is alert and oriented to person, place, and time.   Psychiatric:         Mood and Affect: Mood normal.         Speech: Speech normal.         Behavior: Behavior normal. Behavior is cooperative.       Vitals:    05/31/22 0939   BP: 122/74   Pulse: 59   Temp: 97.6 °F (36.4 °C)   SpO2: 98%   Weight: 85.4 kg (188 lb 3.2 oz)   Height: 188 cm (74\")     Body mass index is 24.16 kg/m².     Results Review:   I reviewed the patient's new clinical results.    Admission on 03/31/2022, Discharged on 03/31/2022   Component Date Value Ref Range Status   • Case Report 03/31/2022    Final                    Value:Surgical Pathology Report                         Case: [id number]                                  Authorizing Provider:  Carlos Shore MD  Collected:           03/31/2022 09:37 AM          Ordering Location:     [facility]    Received:            03/31/2022 01:40 PM                                 [facility]                                                                    Pathologist:           Wilmer Sherwood MD                                                     Specimens:   1) - Gastric, Body, gastric for h pylori                                                            2) - Esophagus, Distal, distal esophagus for esophagitis and reflux                                 3) - Large Intestine, Right / Ascending Colon, ASCENDING COLON PO LYP "                     • Final Diagnosis 03/31/2022    Final                    Value:This result contains rich text formatting which cannot be displayed here.   Lab on 03/29/2022   Component Date Value Ref Range Status   • SARS-CoV-2 ROLY 03/29/2022 Not Detected  Not Detected Final   Admission on 03/19/2022, Discharged on 03/20/2022   Component Date Value Ref Range Status   • Glucose 03/19/2022 102 (A) 65 - 99 mg/dL Final   • BUN 03/19/2022 11  6 - 20 mg/dL Final   • Creatinine 03/19/2022 0.92  0.76 - 1.27 mg/dL Final   • Sodium 03/19/2022 139  136 - 145 mmol/L Final   • Potassium 03/19/2022 3.7  3.5 - 5.2 mmol/L Final   • Chloride 03/19/2022 100  98 - 107 mmol/L Final   • CO2 03/19/2022 27.5  22.0 - 29.0 mmol/L Final   • Calcium 03/19/2022 10.3  8.6 - 10.5 mg/dL Final   • Total Protein 03/19/2022 7.3  6.0 - 8.5 g/dL Final   • Albumin 03/19/2022 4.90  3.50 - 5.20 g/dL Final   • ALT (SGPT) 03/19/2022 19  1 - 41 U/L Final   • AST (SGOT) 03/19/2022 32  1 - 40 U/L Final   • Alkaline Phosphatase 03/19/2022 70  39 - 117 U/L Final   • Total Bilirubin 03/19/2022 1.2  0.0 - 1.2 mg/dL Final   • Globulin 03/19/2022 2.4  gm/dL Final   • A/G Ratio 03/19/2022 2.0  g/dL Final   • BUN/Creatinine Ratio 03/19/2022 12.0  7.0 - 25.0 Final   • Anion Gap 03/19/2022 11.5  5.0 - 15.0 mmol/L Final   • eGFR 03/19/2022 96.4  >60.0 mL/min/1.73 Final    National Kidney Foundation and American Society of Nephrology (ASN) Task Force recommended calculation based on the Chronic Kidney Disease Epidemiology Collaboration (CKD-EPI) equation refit without adjustment for race.   • Lipase 03/19/2022 62 (A) 13 - 60 U/L Final   • Magnesium 03/19/2022 2.3  1.6 - 2.6 mg/dL Final   • WBC 03/19/2022 6.10  3.40 - 10.80 10*3/mm3 Final   • RBC 03/19/2022 4.85  4.14 - 5.80 10*6/mm3 Final   • Hemoglobin 03/19/2022 14.5  13.0 - 17.7 g/dL Final   • Hematocrit 03/19/2022 42.0  37.5 - 51.0 % Final   • MCV 03/19/2022 86.6  79.0 - 97.0 fL Final   • MCH 03/19/2022 29.9   26.6 - 33.0 pg Final   • MCHC 03/19/2022 34.5  31.5 - 35.7 g/dL Final   • RDW 03/19/2022 12.3  12.3 - 15.4 % Final   • RDW-SD 03/19/2022 39.0  37.0 - 54.0 fl Final   • MPV 03/19/2022 9.1  6.0 - 12.0 fL Final   • Platelets 03/19/2022 246  140 - 450 10*3/mm3 Final   • Neutrophil % 03/19/2022 61.8  42.7 - 76.0 % Final   • Lymphocyte % 03/19/2022 25.2  19.6 - 45.3 % Final   • Monocyte % 03/19/2022 9.5  5.0 - 12.0 % Final   • Eosinophil % 03/19/2022 2.1  0.3 - 6.2 % Final   • Basophil % 03/19/2022 1.1  0.0 - 1.5 % Final   • Immature Grans % 03/19/2022 0.3  0.0 - 0.5 % Final   • Neutrophils, Absolute 03/19/2022 3.76  1.70 - 7.00 10*3/mm3 Final   • Lymphocytes, Absolute 03/19/2022 1.54  0.70 - 3.10 10*3/mm3 Final   • Monocytes, Absolute 03/19/2022 0.58  0.10 - 0.90 10*3/mm3 Final   • Eosinophils, Absolute 03/19/2022 0.13  0.00 - 0.40 10*3/mm3 Final   • Basophils, Absolute 03/19/2022 0.07  0.00 - 0.20 10*3/mm3 Final   • Immature Grans, Absolute 03/19/2022 0.02  0.00 - 0.05 10*3/mm3 Final   • nRBC 03/19/2022 0.0  0.0 - 0.2 /100 WBC Final      US Abdomen Complete     Result Date: 4/20/2022  Unremarkable abdominal ultrasound.  This report was signed and finalized on 4/20/2022 10:28 AM by Edwin Ozuna M.D..     EGD dated 3/31/2022 per Dr. Shore  - Normal oropharynx.  - Z-line regular, 40 cm from the incisors.  - No gross lesions in esophagus. Biopsied.  - Mild Bilious gastric fluid. May have bile gastropathy  - Erythematous mucosa in the posterior wall of the stomach and antrum. Biopsied.  - Normal duodenal bulb, first portion of the duodenum, second portion of the duodenum and third portion of the duodenum.  -Gastric biopsies with mild chronic gastritis with reactive epithelial changes.  No H. pylori.  Distal esophagus biopsies unremarkable.    Colonoscopy dated 3/31/2022 per Dr. Shore  - Preparation of the colon was fair.  - Diverticulosis in the sigmoid colon.  - One 2 to 3 mm polyp in the proximal ascending  colon, removed with a cold biopsy forceps. Resected and retrieved.  - External and internal hemorrhoids.  - The examined portion of the ileum was normal.  -Ascending colon polyp biopsy with benign colonic mucosa with prominent lymphoid aggregate.    Assessment / Plan      1. Epigastric pain  2. Bloating  3. Right lower quadrant abdominal pain  4. Gastroesophageal reflux disease without esophagitis  Symptoms are unchanged and continue to occur after eating. He had gallbladder removed, but it did not help his symptoms much. He was switched to Nexium 40 mg daily and continues to have breakthrough reflux. Abdominal ultrasound unremarkable. EGD unremarkable, no cause for his symptoms. Colonoscopy with diverticulosis, but no evidence of diverticulitis, otherwise unremarkable. Etiology unclear.  Labs  CTAP to rule out solid organ pathology.   Continue Low FODMAP diet.  Continue Nexium 40 mg daily.    - CBC (No Diff); Future  - Comprehensive Metabolic Panel; Future  - TSH; Future  - IgA; Future  - Tissue Transglutaminase, IgA; Future  - CT Abdomen Pelvis With Contrast; Future    10/25/2021  Patient has a history of epigastric pain off and on for several years that has been occurring more frequently than in the past.  He has the pain after eating that radiates to his back.  He occasionally wakes at night with discomfort in the epigastric pain.  No significant nausea.  No melena. He was told several years ago he had gallstones.  He has a history of reflux for a few years that is reasonably controlled with omeprazole 20 mg daily.  Occasionally if reflux is severe, he takes Pepcid as needed.  No difficulty swallowing. Basic labs unremarkable. Lipase borderline elevated at 63, not consistent with pancreatitis.    5. Encounter for screening for malignant neoplasm of colon  Colonoscopy unremarkable, lympohoid aggregate removed, no true polyp. No family history of colon cancer.  Colonoscopy for screening in 10 years, 2032.      Patient Instructions   1. Antireflux measures: Avoid fried, fatty foods, alcohol, chocolate, coffee, tea,  soft drinks, peppermint and spearmint, spicy foods, tomatoes and tomato based foods, onions, peppers, and smoking.   2. Other antireflux measures include weight reduction if overweight, avoiding tight clothing around the abdomen, elevating the head of the bed 6 inches with blocks under the head board, and don't drink or eat before going to bed and avoid lying down immediately after meals.  3. Avoid vaping/smoking.  4. Esomeprazole 40 mg 1 by mouth in the am 30 minutes before breakfast.  5. High fiber, low fat diet with liberal water intake.  6. Labs  7. CT abdomen and pelvis  8. Colonoscopy for screening in 10 years, 2032.   9. Follow up: 3 months or sooner if needed    Heartburn  Heartburn is a type of pain or discomfort that can happen in the throat or chest. It is often described as a burning pain. It may also cause a bad, acid-like taste in the mouth. Heartburn may feel worse when you lie down or bend over, and it is often worse at night. Heartburn may be caused by stomach contents that move back up into the esophagus (reflux).  Follow these instructions at home:  Eating and drinking    1. Avoid certain foods and drinks as told by your health care provider. This may include:  ? Coffee and tea, with or without caffeine.  ? Drinks that contain alcohol.  ? Energy drinks and sports drinks.  ? Carbonated drinks or sodas.  ? Chocolate and cocoa.  ? Peppermint and mint flavorings.  ? Garlic and onions.  ? Horseradish.  ? Spicy and acidic foods, including peppers, chili powder, quintana powder, vinegar, hot sauces, and barbecue sauce.  ? Citrus fruit juices and citrus fruits, such as oranges, steven, and limes.  ? Tomato-based foods, such as red sauce, chili, salsa, and pizza with red sauce.  ? Fried and fatty foods, such as donuts, french fries, potato chips, and high-fat dressings.  ? High-fat meats, such as hot  dogs and fatty cuts of red and white meats, such as rib eye steak, sausage, ham, and smallwood.  ? High-fat dairy items, such as whole milk, butter, and cream cheese.  2. Eat small, frequent meals instead of large meals.  3. Avoid drinking large amounts of liquid with your meals.  4. Avoid eating meals during the 2-3 hours before bedtime.  5. Avoid lying down right after you eat.  6. Do not exercise right after you eat.    Lifestyle         · If you are overweight, reduce your weight to an amount that is healthy for you. Ask your health care provider for guidance about a safe weight loss goal.  · Do not use any products that contain nicotine or tobacco. These products include cigarettes, chewing tobacco, and vaping devices, such as e-cigarettes. These can make symptoms worse. If you need help quitting, ask your health care provider.  · Wear loose-fitting clothing. Do not wear anything tight around your waist that causes pressure on your abdomen.  · Raise (elevate) the head of your bed about 6 inches (15 cm) when you sleep. You can use a wedge to do this.  · Try to reduce your stress, such as with yoga or meditation. If you need help reducing stress, ask your health care provider.  Medicines  · Take over-the-counter and prescription medicines only as told by your health care provider.  · Do not take aspirin or NSAIDs, such as ibuprofen, unless your health care provider told you to do so.  · Stop medicines only as told by your health care provider. If you stop taking some medicines too quickly, your symptoms may get worse.  General instructions  · Pay attention to any changes in your symptoms.  · Keep all follow-up visits. This is important.  Contact a health care provider if:  · You have new symptoms.  · You have unexplained weight loss.  · You have difficulty swallowing, or it hurts to swallow.  · You have wheezing or a persistent cough.  · Your symptoms do not improve with treatment.  · You have frequent heartburn for  more than 2 weeks.  Get help right away if:  · You suddenly have pain in your arms, neck, jaw, teeth, or back.  · You suddenly feel sweaty, dizzy, or light-headed.  · You have chest pain or shortness of breath.  · You vomit and your vomit looks like blood or coffee grounds.  · Your stool is bloody or black.  These symptoms may represent a serious problem that is an emergency. Do not wait to see if the symptoms will go away. Get medical help right away. Call your local emergency services (911 in the [place] Do not drive yourself to the hospital.  Summary  · Heartburn is a type of pain or discomfort that can happen in the throat or chest. It is often described as a burning pain. It may also cause a bad, acid-like taste in the mouth.  · Avoid certain foods and drinks as told by your health care provider.  · Take over-the-counter and prescription medicines only as told by your health care provider. Do not take aspirin or NSAIDs, such as ibuprofen, unless your health care provider told you to do so.  · Contact a health care provider if your symptoms do not improve or they get worse.  This information is not intended to replace advice given to you by your health care provider. Make sure you discuss any questions you have with your health care provider.  Document Revised: 06/23/2021 Document Reviewed: 06/23/2021  [employer] Patient Education © 2021 [employer]    Ora Daly, APRSTEPHANIE  5/31/2022    Please note that portions of this note may have been completed with a voice recognition program. Efforts were made to edit the dictations, but occasionally words are mistranscribed.

## 2022-05-31 NOTE — PATIENT INSTRUCTIONS
Antireflux measures: Avoid fried, fatty foods, alcohol, chocolate, coffee, tea,  soft drinks, peppermint and spearmint, spicy foods, tomatoes and tomato based foods, onions, peppers, and smoking.   Other antireflux measures include weight reduction if overweight, avoiding tight clothing around the abdomen, elevating the head of the bed 6 inches with blocks under the head board, and don't drink or eat before going to bed and avoid lying down immediately after meals.  Avoid vaping/smoking.  Esomeprazole 40 mg 1 by mouth in the am 30 minutes before breakfast.  High fiber, low fat diet with liberal water intake.  Labs  CT abdomen and pelvis  Colonoscopy for screening in 10 years, 2032.   Follow up: 3 months or sooner if needed    Heartburn  Heartburn is a type of pain or discomfort that can happen in the throat or chest. It is often described as a burning pain. It may also cause a bad, acid-like taste in the mouth. Heartburn may feel worse when you lie down or bend over, and it is often worse at night. Heartburn may be caused by stomach contents that move back up into the esophagus (reflux).  Follow these instructions at home:  Eating and drinking    Avoid certain foods and drinks as told by your health care provider. This may include:  Coffee and tea, with or without caffeine.  Drinks that contain alcohol.  Energy drinks and sports drinks.  Carbonated drinks or sodas.  Chocolate and cocoa.  Peppermint and mint flavorings.  Garlic and onions.  Horseradish.  Spicy and acidic foods, including peppers, chili powder, quintana powder, vinegar, hot sauces, and barbecue sauce.  Citrus fruit juices and citrus fruits, such as oranges, steven, and limes.  Tomato-based foods, such as red sauce, chili, salsa, and pizza with red sauce.  Fried and fatty foods, such as donuts, french fries, potato chips, and high-fat dressings.  High-fat meats, such as hot dogs and fatty cuts of red and white meats, such as rib eye steak, sausage, ham,  and smallwood.  High-fat dairy items, such as whole milk, butter, and cream cheese.  Eat small, frequent meals instead of large meals.  Avoid drinking large amounts of liquid with your meals.  Avoid eating meals during the 2-3 hours before bedtime.  Avoid lying down right after you eat.  Do not exercise right after you eat.    Lifestyle         If you are overweight, reduce your weight to an amount that is healthy for you. Ask your health care provider for guidance about a safe weight loss goal.  Do not use any products that contain nicotine or tobacco. These products include cigarettes, chewing tobacco, and vaping devices, such as e-cigarettes. These can make symptoms worse. If you need help quitting, ask your health care provider.  Wear loose-fitting clothing. Do not wear anything tight around your waist that causes pressure on your abdomen.  Raise (elevate) the head of your bed about 6 inches (15 cm) when you sleep. You can use a wedge to do this.  Try to reduce your stress, such as with yoga or meditation. If you need help reducing stress, ask your health care provider.  Medicines  Take over-the-counter and prescription medicines only as told by your health care provider.  Do not take aspirin or NSAIDs, such as ibuprofen, unless your health care provider told you to do so.  Stop medicines only as told by your health care provider. If you stop taking some medicines too quickly, your symptoms may get worse.  General instructions  Pay attention to any changes in your symptoms.  Keep all follow-up visits. This is important.  Contact a health care provider if:  You have new symptoms.  You have unexplained weight loss.  You have difficulty swallowing, or it hurts to swallow.  You have wheezing or a persistent cough.  Your symptoms do not improve with treatment.  You have frequent heartburn for more than 2 weeks.  Get help right away if:  You suddenly have pain in your arms, neck, jaw, teeth, or back.  You suddenly feel  sweaty, dizzy, or light-headed.  You have chest pain or shortness of breath.  You vomit and your vomit looks like blood or coffee grounds.  Your stool is bloody or black.  These symptoms may represent a serious problem that is an emergency. Do not wait to see if the symptoms will go away. Get medical help right away. Call your local emergency services (911 in the [place] Do not drive yourself to the hospital.  Summary  Heartburn is a type of pain or discomfort that can happen in the throat or chest. It is often described as a burning pain. It may also cause a bad, acid-like taste in the mouth.  Avoid certain foods and drinks as told by your health care provider.  Take over-the-counter and prescription medicines only as told by your health care provider. Do not take aspirin or NSAIDs, such as ibuprofen, unless your health care provider told you to do so.  Contact a health care provider if your symptoms do not improve or they get worse.  This information is not intended to replace advice given to you by your health care provider. Make sure you discuss any questions you have with your health care provider.  Document Revised: 06/23/2021 Document Reviewed: 06/23/2021  [employer] Patient Education © 2021 [employer]

## 2022-06-20 ENCOUNTER — APPOINTMENT (OUTPATIENT)
Dept: CT IMAGING | Facility: HOSPITAL | Age: 57
End: 2022-06-20

## 2022-06-29 ENCOUNTER — HOSPITAL ENCOUNTER (OUTPATIENT)
Dept: CT IMAGING | Facility: HOSPITAL | Age: 57
Discharge: HOME | End: 2022-06-29
Admitting: NURSE PRACTITIONER

## 2022-06-29 DIAGNOSIS — R14.0 BLOATING: ICD-10-CM

## 2022-06-29 DIAGNOSIS — R93.41 ABNORMAL CT SCAN, BLADDER: Primary | ICD-10-CM

## 2022-06-29 DIAGNOSIS — R10.13 EPIGASTRIC PAIN: ICD-10-CM

## 2022-06-29 DIAGNOSIS — N40.0 ENLARGED PROSTATE: ICD-10-CM

## 2022-06-29 PROCEDURE — 74177 CT ABD & PELVIS W/CONTRAST: CPT

## 2022-06-29 PROCEDURE — 25010000002 IOPAMIDOL 61 % SOLUTION: Performed by: NURSE PRACTITIONER

## 2022-06-29 RX ADMIN — IOPAMIDOL 100 ML: 612 INJECTION, SOLUTION INTRAVENOUS at 12:10

## 2022-07-11 ENCOUNTER — OFFICE VISIT (OUTPATIENT)
Dept: UROLOGY | Facility: CLINIC | Age: 57
End: 2022-07-11

## 2022-07-11 VITALS
OXYGEN SATURATION: 99 % | DIASTOLIC BLOOD PRESSURE: 73 MMHG | HEIGHT: 74 IN | WEIGHT: 188 LBS | TEMPERATURE: 97.1 F | SYSTOLIC BLOOD PRESSURE: 119 MMHG | BODY MASS INDEX: 24.13 KG/M2 | HEART RATE: 62 BPM

## 2022-07-11 DIAGNOSIS — N42.9 DISORDER OF PROSTATE: Primary | ICD-10-CM

## 2022-07-11 LAB
BILIRUB BLD-MCNC: NEGATIVE MG/DL
CLARITY, POC: CLEAR
COLOR UR: YELLOW
EXPIRATION DATE: ABNORMAL
GLUCOSE UR STRIP-MCNC: NEGATIVE MG/DL
KETONES UR QL: NEGATIVE
LEUKOCYTE EST, POC: NEGATIVE
Lab: ABNORMAL
NITRITE UR-MCNC: NEGATIVE MG/ML
PH UR: 6 [PH] (ref 5–8)
PROT UR STRIP-MCNC: ABNORMAL MG/DL
RBC # UR STRIP: NEGATIVE /UL
SP GR UR: 1.03 (ref 1–1.03)
UROBILINOGEN UR QL: NORMAL

## 2022-07-11 PROCEDURE — 99203 OFFICE O/P NEW LOW 30 MIN: CPT | Performed by: PHYSICIAN ASSISTANT

## 2022-07-11 PROCEDURE — 81003 URINALYSIS AUTO W/O SCOPE: CPT | Performed by: PHYSICIAN ASSISTANT

## 2022-07-11 NOTE — PROGRESS NOTES
Chief Complaint  LUTS    Referring Provider  Ora Daly APRN HPI  Mr. Enzweiler is a 57 y.o. male with history of HTN who presents with abnormal CT findings of enlarged prostate.  Primary symptom includes: none, no urinary symptoms  Patient denies dysuria or hematuria.    Patient had a CT scan performed by GI for further evaluation of right lower quadrant pain.  He denies ever having seen a urologist, denies any history of kidney stones, prostate disease.  He denies a family history of prostate cancer.     IPSS Questionnaire (AUA-7):  Incomplete emptying  Over the past month, how often have you had a sensation of not emptying your bladder completely after you finish?: Not at all (07/11/22 0931)  Frequency  Over the past month, how often have you had to urinate again less than two hours after you finishing urinating ?: Not at all (07/11/22 0931)  Intermittency  Over the past month, how often have you found you stopped and started again several time when you urinated ?: Not at all (07/11/22 0931)  Urgency  Over the last month, how difficult  have you found it to postpone urination ?: Not at all (07/11/22 0931)  Weak Stream  Over the past month, how often have you had a weak urinary stream ?: Not at all (07/11/22 0931)  Straining  Over the past month, how often have you had to push or strain to begin urination ?: Not at all (07/11/22 0931)  Nocturia  Over the past month, how many times did you most typically get up to urinate from the time you went to bed until the time you got up in the morning ?: Not at all (07/11/22 0931)  Quality of life due to urinary symptoms  If you were to spend the rest of your life with your urinary condition the way it is now, how would feel about that?: Mixed - about equally satisfied (07/11/22 0931)    Scores  Total IPSS Score: (!) 0 (07/11/22 0931)       Past Medical History  Past Medical History:   Diagnosis Date   • Colon polyp 09/16/2016   • Gallstones    • GERD  "(gastroesophageal reflux disease)    • HTN (hypertension)    • Hx of echocardiogram 2016   • Hyperlipidemia        Past Surgical History  Past Surgical History:   Procedure Laterality Date   • CHOLECYSTECTOMY  10/2021   • COLONOSCOPY  09/16/2016   • COLONOSCOPY N/A 3/31/2022    Procedure: COLONOSCOPY WITH POLYPECTOMY X1;  Surgeon: Carlos Shore MD;  Location: [facility] ENDOSCOPY;  Service: Gastroenterology;  Laterality: N/A;   • ENDOSCOPY N/A 3/31/2022    Procedure: ESOPHAGOGASTRODUODENOSCOPY WITH BIOPSIES;  Surgeon: Carlos Shore MD;  Location: [facility] ENDOSCOPY;  Service: Gastroenterology;  Laterality: N/A;   • KNEE SURGERY Right 1979   • MANDIBLE SURGERY  2006   • TONSILLECTOMY  1986       Medications    Current Outpatient Medications:   •  aspirin 325 MG tablet, Take 325 mg by mouth As Needed for Mild Pain ., Disp: , Rfl:   •  esomeprazole (nexIUM) 40 MG capsule, Take 40 mg by mouth Every Morning Before Breakfast., Disp: , Rfl:   •  losartan (COZAAR) 50 MG tablet, Take 50 mg by mouth Daily., Disp: , Rfl:     Allergies  No Known Allergies    Social History  Social History     Socioeconomic History   • Marital status: Single   Tobacco Use   • Smoking status: Never Smoker   • Smokeless tobacco: Never Used   Vaping Use   • Vaping Use: Never used   Substance and Sexual Activity   • Alcohol use: Yes     Comment: occ   • Drug use: No   • Sexual activity: Defer       Family History  He has no family history of prostate cancer  Family History   Problem Relation Age of Onset   • Cancer Mother         lymphoma   • Ulcers Mother    • Heart disease Father         MI   • Hypertension Sister    • Hyperlipidemia Sister    • Hypertension Brother    • Hyperlipidemia Brother    • Stroke Paternal Grandmother    • Colon cancer Neg Hx          Physical Exam  Visit Vitals  /73   Pulse 62   Temp 97.1 °F (36.2 °C)   Ht 188 cm (74\")   Wt 85.3 kg (188 lb)   SpO2 99%   BMI 24.14 kg/m²         Labs  No results found for: " PSA    Brief Urine Lab Results  (Last result in the past 365 days)      Color   Clarity   Blood   Leuk Est   Nitrite   Protein   CREAT   Urine HCG        07/11/22 0936 Yellow   Clear   Negative   Negative   Negative   Trace                 PVR  Post-void residual performed by staff -0 cc    Radiology  CT Abdomen Pelvis With Contrast    Result Date: 6/29/2022  CT SCAN OF THE ABDOMEN AND PELVIS WITH CONTRAST    6/29/2022 12:00 PM  HISTORY: Epigastric pain, bloating, RLQ pain; R10.13-Epigastric pain; R14.0-Abdominal distension (gaseous)Abdominal pain.  PROCEDURE: Axial CT images were obtained from the lung bases to the pubic symphysis following IV contrast administration. Coronal and sagittal reformatted images were generated from the axial data set and provided for interpretation. This study was performed with techniques to keep radiation doses as low as reasonably achievable, (ALARA). Individualized dose reduction techniques using automated exposure control or adjustment of mA and/or kV according to the patient size were employed.  COMPARISON: Abdominal ultrasound dated 04/20/2022.  FINDINGS: LOWER CHEST: The heart is normal in size. Lung bases are clear.  ABDOMEN/PELVIS: Liver, gallbladder and bile ducts: Mild hepatic steatosis, otherwise the liver enhances homogenously without suspicious focal hepatic lesion. Prior cholecystectomy. No significant biliary ductal dilatation.  Adrenal glands: The adrenal glands are morphologically unremarkable without suspicious lesion.  Kidneys, ureters and urinary bladder: No suspicious renal lesions. No hydronephrosis. Mild the prominent bilateral renal pelvises and ureters. No nephrolithiasis. Mild diffuse urinary bladder wall thickening.  Spleen: The spleen is normal in size.  Pancreas: The pancreas is unremarkable.  Gastrointestinal system and mesentery: There is no evidence of bowel obstruction. The appendix is visualized and unremarkable. No significant mesenteric  inflammation. Abnormal appearance of the cecum.  Lymph nodes: No pathologically enlarged abdominal or pelvic lymph nodes are present.  Vessels: The abdominal aorta is normal in caliber. Mild aortic and bilateral iliac atherosclerotic disease. The celiac trunk, superior mesenteric artery, inferior mesenteric artery and their branch vessels appear grossly patent. The superior mesenteric vein, splenic vein and main portal veins are patent. The inferior vena cava and hepatic veins are unremarkable.  Peritoneum: No free intraperitoneal fluid or pneumoperitoneum.  Pelvic viscera: Large prostate measuring up to 5 cm in transverse diameter.  Body wall: No acute findings. No significant body wall hernias.  Bones: No acute fracture.      Impression: 1. Diffuse urinary bladder wall thickening, concerning for possible cystitis. Mildly prominent bilateral renal collecting systems and ureters, concerning for urinary stasis/reflux uropathy/chronic urinary bladder outlet obstruction. Enlarged prostate. 2. Abnormal appearance of the cecum, concerning for possible cecal mass. Recommend further evaluation with colonoscopy. 3. Mild hepatic steatosis.      Images personally reviewed, interpreted and dictated by TOM Lema.   439.99 mGy.cm   This study was performed with techniques to keep radiation doses as low as reasonably achievable (ALARA). Individualized dose reduction techniques using automated exposure control or adjustment of mA and/or kV according to the patient size were employed.      This report was signed and finalized on 6/29/2022 4:05 PM by TOM eLma.        Assessment  Mr. Enzweiler is a 57 y.o. male who presents with enlarged prostate on CT.  His CT additionally found diffuse bladder wall thickening and prominence of bilateral collecting systems and ureters.  He however denies any LUTS, his PVR is 0, his IPSS is 0.  His last PSA was in 2018 and was 0.6.  He denies any family history of prostate  cancer.  His urine is benign.  He additionally had an abdominal ultrasound performed in April that was without hydronephrosis.      Plan  1.  Obtain total to free PSA today  2.  Obtain renal ultrasound in 4 weeks to evaluate hydronephrosis      Noreen Moore PA-C

## 2022-07-12 LAB
PSA FREE MFR SERPL: 20.9 %
PSA FREE SERPL-MCNC: 0.23 NG/ML
PSA SERPL-MCNC: 1.1 NG/ML (ref 0–4)

## 2022-08-09 ENCOUNTER — HOSPITAL ENCOUNTER (OUTPATIENT)
Dept: ULTRASOUND IMAGING | Facility: HOSPITAL | Age: 57
Discharge: HOME | End: 2022-08-09
Admitting: PHYSICIAN ASSISTANT

## 2022-08-09 DIAGNOSIS — N42.9 DISORDER OF PROSTATE: ICD-10-CM

## 2022-08-09 PROCEDURE — 76775 US EXAM ABDO BACK WALL LIM: CPT

## 2022-08-12 ENCOUNTER — OFFICE VISIT (OUTPATIENT)
Dept: UROLOGY | Facility: CLINIC | Age: 57
End: 2022-08-12

## 2022-08-12 VITALS
TEMPERATURE: 98.4 F | WEIGHT: 188 LBS | BODY MASS INDEX: 24.13 KG/M2 | DIASTOLIC BLOOD PRESSURE: 84 MMHG | OXYGEN SATURATION: 99 % | HEART RATE: 61 BPM | HEIGHT: 74 IN | SYSTOLIC BLOOD PRESSURE: 128 MMHG

## 2022-08-12 DIAGNOSIS — N13.39 OTHER HYDRONEPHROSIS: Primary | ICD-10-CM

## 2022-08-12 PROCEDURE — 99213 OFFICE O/P EST LOW 20 MIN: CPT | Performed by: PHYSICIAN ASSISTANT

## 2022-08-12 NOTE — PROGRESS NOTES
"Chief Complaint   Patient presents with   • Follow-up     4 week follow up with labs and renal ultrasound.         HPI  Mr. Enzweiler is a 57 y.o. male with history of enlarged prostate and bladder wall thickening on CT who presents for follow up.     At this visit, continues to have any urinary symptoms.  He does continue to have chronic right lower quadrant pain that is being further treated by GI.    Past Medical History:   Diagnosis Date   • Colon polyp 09/16/2016   • Gallstones    • GERD (gastroesophageal reflux disease)    • HTN (hypertension)    • Hx of echocardiogram 2016   • Hyperlipidemia        Past Surgical History:   Procedure Laterality Date   • CHOLECYSTECTOMY  10/2021   • COLONOSCOPY  09/16/2016   • COLONOSCOPY N/A 3/31/2022    Procedure: COLONOSCOPY WITH POLYPECTOMY X1;  Surgeon: Carlos Shore MD;  Location: [facility] ENDOSCOPY;  Service: Gastroenterology;  Laterality: N/A;   • ENDOSCOPY N/A 3/31/2022    Procedure: ESOPHAGOGASTRODUODENOSCOPY WITH BIOPSIES;  Surgeon: Carlos Shore MD;  Location: [facility] ENDOSCOPY;  Service: Gastroenterology;  Laterality: N/A;   • KNEE SURGERY Right 1979   • MANDIBLE SURGERY  2006   • TONSILLECTOMY  1986         Current Outpatient Medications:   •  esomeprazole (nexIUM) 40 MG capsule, Take 40 mg by mouth Every Morning Before Breakfast., Disp: , Rfl:   •  losartan (COZAAR) 50 MG tablet, Take 50 mg by mouth Daily., Disp: , Rfl:   •  aspirin 325 MG tablet, Take 325 mg by mouth As Needed for Mild Pain ., Disp: , Rfl:      Physical Exam  Visit Vitals  /84 (BP Location: Left arm, Patient Position: Sitting, Cuff Size: Adult)   Pulse 61   Temp 98.4 °F (36.9 °C) (Temporal)   Ht 188 cm (74\")   Wt 85.3 kg (188 lb)   SpO2 99%   BMI 24.14 kg/m²       Labs  Brief Urine Lab Results  (Last result in the past 365 days)      Color   Clarity   Blood   Leuk Est   Nitrite   Protein   CREAT   Urine HCG        07/11/22 0936 Yellow   Clear   Negative   Negative   " Negative   Trace                 Lab Results   Component Value Date    GLUCOSE 102 (H) 03/19/2022    CALCIUM 10.3 03/19/2022     03/19/2022    K 3.7 03/19/2022    CO2 27.5 03/19/2022     03/19/2022    BUN 11 03/19/2022    CREATININE 0.92 03/19/2022    EGFRIFNONA 86 10/27/2021    BCR 12.0 03/19/2022    ANIONGAP 11.5 03/19/2022       Lab Results   Component Value Date    WBC 6.10 03/19/2022    HGB 14.5 03/19/2022    HCT 42.0 03/19/2022    MCV 86.6 03/19/2022     03/19/2022            Lab Results   Component Value Date    PSA 1.1 07/11/2022           Radiographic Studies  US Abdomen Complete  Result Date: 4/20/2022  Unremarkable abdominal ultrasound.  This report was signed and finalized on 4/20/2022 10:28 AM by Edwin Ozuna M.D..    CT Abdomen Pelvis With Contrast  Result Date: 6/29/2022  1. Diffuse urinary bladder wall thickening, concerning for possible cystitis. Mildly prominent bilateral renal collecting systems and ureters, concerning for urinary stasis/reflux uropathy/chronic urinary bladder outlet obstruction. Enlarged prostate. 2. Abnormal appearance of the cecum, concerning for possible cecal mass. Recommend further evaluation with colonoscopy. 3. Mild hepatic steatosis.      Images personally reviewed, interpreted and dictated by TOM Lema.   439.99 mGy.cm   This study was performed with techniques to keep radiation doses as low as reasonably achievable (ALARA). Individualized dose reduction techniques using automated exposure control or adjustment of mA and/or kV according to the patient size were employed.      This report was signed and finalized on 6/29/2022 4:05 PM by TOM Lema.    US Renal Bilateral  Result Date: 8/9/2022  No evidence of obstructive uropathy.    Images personally reviewed, interpreted and dictated by TOM Lema.  This report was signed and finalized on 8/9/2022 10:20 AM by TOM Lema.          Assessment  57 y.o.  male with enlarged prostate and bladder wall thickening with mild bilateral hydro on CT.  His PVR at last visit was 0 and his IPSS was 0.  His PSA is well within normal limits at 1.1.  His renal ultrasound obtained 2 months from CT reveals no hydroureteronephrosis.  Given his benign work-up, we will continue to follow him annually for ongoing screening of prostate cancer and TAYLOR.    Plan  1.  Obtain IPSS, PVR, UA, PSA in 1 year

## 2022-08-22 ENCOUNTER — OFFICE VISIT (OUTPATIENT)
Dept: GASTROENTEROLOGY | Facility: CLINIC | Age: 57
End: 2022-08-22

## 2022-08-22 VITALS
HEIGHT: 74 IN | TEMPERATURE: 96.4 F | BODY MASS INDEX: 24.13 KG/M2 | DIASTOLIC BLOOD PRESSURE: 68 MMHG | WEIGHT: 188 LBS | SYSTOLIC BLOOD PRESSURE: 116 MMHG

## 2022-08-22 DIAGNOSIS — R14.0 BLOATING: ICD-10-CM

## 2022-08-22 DIAGNOSIS — R10.31 RIGHT LOWER QUADRANT ABDOMINAL PAIN: ICD-10-CM

## 2022-08-22 DIAGNOSIS — K21.9 GASTROESOPHAGEAL REFLUX DISEASE WITHOUT ESOPHAGITIS: ICD-10-CM

## 2022-08-22 DIAGNOSIS — K76.0 HEPATIC STEATOSIS: ICD-10-CM

## 2022-08-22 DIAGNOSIS — R93.41 ABNORMAL CT SCAN, BLADDER: ICD-10-CM

## 2022-08-22 DIAGNOSIS — Z12.11 ENCOUNTER FOR SCREENING FOR MALIGNANT NEOPLASM OF COLON: ICD-10-CM

## 2022-08-22 DIAGNOSIS — N40.0 ENLARGED PROSTATE: ICD-10-CM

## 2022-08-22 DIAGNOSIS — R10.13 EPIGASTRIC PAIN: Primary | ICD-10-CM

## 2022-08-22 PROCEDURE — 99214 OFFICE O/P EST MOD 30 MIN: CPT | Performed by: NURSE PRACTITIONER

## 2022-08-22 NOTE — PROGRESS NOTES
Follow Up Note     Date: 2022   Patient Name: Benedict A Enzweiler  MRN: [id number]  : 1965     Primary Care Provider: Whitney Soto MD     Chief Complaint   Patient presents with   • Follow-up   • Abdominal Pain     History of present illness:   2022  Benedict A Enzweiler is a 57 y.o. male who is here today for follow up for abdominal pain.    He has been doing better. The pain and bloating have improved since cutting out dairy and gluten. Reflux well controlled with high dose PPI. Denies GI bleeding.     Interval History:  2022  Benedict A Enzweiler is a 57 y.o. male who is here today for follow up after procedures.  He had his gallbladder removed, but it did not help much with his abdominal pain and bloating after eating. He is following Low FODMAP diet but it does not help. He has found several foods that seem to make his symptoms worse and he avoids them.  He was taking Omeprazole but he continued to have frequent breakthrough reflux. He was started on Nexium 40 mg, but still has breakthrough. Denies constipation, diarrhea or rectal bleeding.      10/25/2021  The patient denies constipation, diarrhea or rectal bleeding. He has a history of epigastric pain off and on for several years that been occurring more frequently than in the past. He has the pain after he eats that will radiate to his back. He occasionally wakes at night due to discomfort in the epigastric area.  Eating fatty foods make his pain worse. No significant nausea.     He has a history of reflux for a few years. Every time he eats he has reflux. He denies reflux at night. He is taking Omeprazole 20 mg daily with reasonable control typically. If reflux is severe, he takes a Pepcid as needed, which helps. No difficulty swallowing.      His last colonoscopy was in 2016 with polyp removed by Dr. Monroe, surgical services. He has not had an EGD in the past. There is no family history of GI malignancy.     Subjective       Past Medical History:   Diagnosis Date   • Colon polyp 09/16/2016   • Gallstones    • GERD (gastroesophageal reflux disease)    • HTN (hypertension)    • Hx of echocardiogram 2016   • Hyperlipidemia      Past Surgical History:   Procedure Laterality Date   • CHOLECYSTECTOMY  10/2021   • COLONOSCOPY  09/16/2016   • COLONOSCOPY N/A 3/31/2022    Procedure: COLONOSCOPY WITH POLYPECTOMY X1;  Surgeon: Carlos Shore MD;  Location: [facility] ENDOSCOPY;  Service: Gastroenterology;  Laterality: N/A;   • ENDOSCOPY N/A 3/31/2022    Procedure: ESOPHAGOGASTRODUODENOSCOPY WITH BIOPSIES;  Surgeon: Carlos Shore MD;  Location: [facility] ENDOSCOPY;  Service: Gastroenterology;  Laterality: N/A;   • KNEE SURGERY Right 1979   • MANDIBLE SURGERY  2006   • TONSILLECTOMY  1986     Family History   Problem Relation Age of Onset   • Cancer Mother         lymphoma   • Ulcers Mother    • Heart disease Father         MI   • Hypertension Sister    • Hyperlipidemia Sister    • Hypertension Brother    • Hyperlipidemia Brother    • Stroke Paternal Grandmother    • Colon cancer Neg Hx      Social History     Socioeconomic History   • Marital status: Single   Tobacco Use   • Smoking status: Never Smoker   • Smokeless tobacco: Never Used   Vaping Use   • Vaping Use: Never used   Substance and Sexual Activity   • Alcohol use: Yes     Comment: occ   • Drug use: No   • Sexual activity: Defer       Current Outpatient Medications:   •  aspirin 325 MG tablet, Take 325 mg by mouth As Needed for Mild Pain ., Disp: , Rfl:   •  losartan (COZAAR) 50 MG tablet, Take 50 mg by mouth Daily., Disp: , Rfl:   •  esomeprazole (nexIUM) 20 MG capsule, 1 tablet by mouth in the am 30 minutes before breakfast. May take additional dose at bedtime if needed., Disp: 180 capsule, Rfl: 3     No Known Allergies     The following portions of the patient's history were reviewed and updated as appropriate: allergies, current medications, past family history, past  "medical history, past social history, past surgical history and problem list.  Objective     Physical Exam  Vitals and nursing note reviewed.   Constitutional:       General: He is not in acute distress.     Appearance: Normal appearance. He is well-developed.   HENT:      Head: Normocephalic and atraumatic.      Mouth/Throat:      Mouth: Mucous membranes are not pale, not dry and not cyanotic.   Eyes:      General: Lids are normal.   Neck:      Trachea: Trachea normal.   Cardiovascular:      Rate and Rhythm: Normal rate.   Pulmonary:      Effort: Pulmonary effort is normal. No respiratory distress.      Breath sounds: Normal breath sounds.   Abdominal:      Tenderness: There is no abdominal tenderness.   Skin:     General: Skin is warm and dry.   Neurological:      Mental Status: He is alert and oriented to person, place, and time.   Psychiatric:         Mood and Affect: Mood normal.         Speech: Speech normal.         Behavior: Behavior normal. Behavior is cooperative.       Vitals:    08/22/22 0908   BP: 116/68   Temp: 96.4 °F (35.8 °C)   Weight: 85.3 kg (188 lb)   Height: 188 cm (74\")     Body mass index is 24.14 kg/m².     Results Review:   I reviewed the patient's new clinical results.    Office Visit on 07/11/2022   Component Date Value Ref Range Status   • Color 07/11/2022 Yellow  Yellow, Straw, Dark Yellow, Lilliam Final   • Clarity, UA 07/11/2022 Clear  Clear Final   • Specific Gravity  07/11/2022 1.030  1.005 - 1.030 Final   • pH, Urine 07/11/2022 6.0  5.0 - 8.0 Final   • Leukocytes 07/11/2022 Negative  Negative Final   • Nitrite, UA 07/11/2022 Negative  Negative Final   • Protein, POC 07/11/2022 Trace (A) Negative mg/dL Final   • Glucose, UA 07/11/2022 Negative  Negative mg/dL Final   • Ketones, UA 07/11/2022 Negative  Negative Final   • Urobilinogen, UA 07/11/2022 Normal  Normal Final   • Bilirubin 07/11/2022 Negative  Negative Final   • Blood, UA 07/11/2022 Negative  Negative Final   • Lot Number " 07/11/2022 [id number]   Final   • Expiration Date 07/11/2022 12-21-23   Final   • PSA 07/11/2022 1.1  0.0 - 4.0 ng/mL Final    Comment: Roche ECLIA methodology.  According to the American Urological Association, Serum PSA should  decrease and remain at undetectable levels after radical  prostatectomy. The AUA defines biochemical recurrence as an initial  PSA value 0.2 ng/mL or greater followed by a subsequent confirmatory  PSA value 0.2 ng/mL or greater.  Values obtained with different assay methods or kits cannot be used  interchangeably. Results cannot be interpreted as absolute evidence  of the presence or absence of malignant disease.     • PSA, Free 07/11/2022 0.23  N/A ng/mL Final    Roche ECLIA methodology.   • PSA, Free % 07/11/2022 20.9  % Final    Comment: The table below lists the probability of prostate cancer for  men with non-suspicious SHRADDHA results and total PSA between  4 and 10 ng/mL, by patient age (Isidoro et al, EMILY 1998,  279:1542).                    % Free PSA       50-64 yr        65-75 yr                    0.00-10.00%        56%             55%                   10.01-15.00%        24%             35%                   15.01-20.00%        17%             23%                   20.01-25.00%        10%             20%                        >25.00%         5%              9%  Please note:  Isidoro et al did not make specific                recommendations regarding the use of                percent free PSA for any other population                of men.        US Abdomen Complete     Result Date: 4/20/2022  Unremarkable abdominal ultrasound.  This report was signed and finalized on 4/20/2022 10:28 AM by Edwin Ozuna M.D..    CT Abdomen Pelvis With Contrast    Result Date: 6/29/2022  1. Diffuse urinary bladder wall thickening, concerning for possible cystitis. Mildly prominent bilateral renal collecting systems and ureters, concerning for urinary stasis/reflux uropathy/chronic  urinary bladder outlet obstruction. Enlarged prostate. 2. Abnormal appearance of the cecum, concerning for possible cecal mass. Recommend further evaluation with colonoscopy. 3. Mild hepatic steatosis.   This report was signed and finalized on 6/29/2022 4:05 PM by TOM Lema.    US Renal Bilateral    Result Date: 8/9/2022  No evidence of obstructive uropathy. This report was signed and finalized on 8/9/2022 10:20 AM by TOM Lema.     EGD dated 3/31/2022 per Dr. Shore  - Normal oropharynx.  - Z-line regular, 40 cm from the incisors.  - No gross lesions in esophagus. Biopsied.  - Mild Bilious gastric fluid. May have bile gastropathy  - Erythematous mucosa in the posterior wall of the stomach and antrum. Biopsied.  - Normal duodenal bulb, first portion of the duodenum, second portion of the duodenum and third portion of the duodenum.  -Gastric biopsies with mild chronic gastritis with reactive epithelial changes.  No H. pylori.  Distal esophagus biopsies unremarkable.     Colonoscopy dated 3/31/2022 per Dr. Shore  - Preparation of the colon was fair.  - Diverticulosis in the sigmoid colon.  - One 2 to 3 mm polyp in the proximal ascending colon, removed with a cold biopsy forceps. Resected and retrieved.  - External and internal hemorrhoids.  - The examined portion of the ileum was normal.  -Ascending colon polyp biopsy with benign colonic mucosa with prominent lymphoid aggregate.    Assessment / Plan      1. Epigastric pain  2. Bloating  3. Right lower quadrant abdominal pain  4. Gastroesophageal reflux disease without esophagitis  He has a history of epigastric pain with bloating and right lower quadrant pain off and on for several years that has improved since cutting out dairy and gluten products.  No significant nausea.  No history of melena.  He is taking esomeprazole 40 mg daily with reasonable control of reflux.  No difficulty swallowing.  Denies GI bleeding. Basic labs unremarkable.  Lipase with borderline elevation, but not consistent with pancreatitis. Abdominal ultrasound unremarkable.  EGD unremarkable, no cause for his symptoms.  Colonoscopy with diverticulosis, but no evidence of diverticulitis, otherwise unremarkable.  CTAP with diffuse bladder wall thickening, enlarged prostate, hepatic steatosis, and abnormal appearance of the cecum, although no abnormality of the cecum noted on recent colonoscopy. He has labs this week for PCP office.  Anti-reflux measures.  Nexium 20 mg 1 po daily. May take additional dose at bedtime as needed.   Continue to avoid dairy and gluten.   Consider TSH if not already undertaken.    - esomeprazole (nexIUM) 20 MG capsule; 1 tablet by mouth in the am 30 minutes before breakfast. May take additional dose at bedtime if needed.  Dispense: 180 capsule; Refill: 3    5. Hepatic steatosis  Recent CTAP with mild hepatic steatosis noted. BMI normal at 24.14. No history of elevated liver enzymes.   Advised low fat diet and exercise. Maintain ideal body weight.     6. Enlarged prostate  7. Abnormal CT scan, bladder  Recent CTAP with enlarged prostate and bladder wall thickening. He has been seen by urology and was told everything was normal after further testing.   Continue to follow with urology.    8. Encounter for screening for malignant neoplasm of colon  Colonoscopy unremarkable, lympohoid aggregate removed, no true polyp. No family history of colon cancer.  Colonoscopy for screening in 10 years, 2032.     Patient Instructions   1. Antireflux measures: Avoid fried, fatty foods, alcohol, chocolate, coffee, tea,  soft drinks, peppermint and spearmint, spicy foods, tomatoes and tomato based foods, onions, peppers, and smoking.   2. Other antireflux measures include weight reduction if overweight, avoiding tight clothing around the abdomen, elevating the head of the bed 6 inches with blocks under the head board, and don't drink or eat before going to bed and avoid lying  down immediately after meals.  3. Esomeprazole 20 mg 1 by mouth in the am 30 minutes before breakfast. May take additional 20 mg dose in the evening if needed.  4. The patient should eat 4-5 very small meals throughout the day. Avoid large meals.  5. High fiber, low fat diet with liberal water intake.   6. Advised to exercise 30 minutes 4-5 days per week.   7. Colonoscopy for screening in 2032.   8. Follow up: 1 year or sooner if needed    Ora Daly, APRN  8/22/2022    Please note that portions of this note may have been completed with a voice recognition program. Efforts were made to edit the dictations, but occasionally words are mistranscribed.

## 2022-08-22 NOTE — PATIENT INSTRUCTIONS
Antireflux measures: Avoid fried, fatty foods, alcohol, chocolate, coffee, tea,  soft drinks, peppermint and spearmint, spicy foods, tomatoes and tomato based foods, onions, peppers, and smoking.   Other antireflux measures include weight reduction if overweight, avoiding tight clothing around the abdomen, elevating the head of the bed 6 inches with blocks under the head board, and don't drink or eat before going to bed and avoid lying down immediately after meals.  Esomeprazole 20 mg 1 by mouth in the am 30 minutes before breakfast. May take additional 20 mg dose in the evening if needed.  The patient should eat 4-5 very small meals throughout the day. Avoid large meals.  High fiber, low fat diet with liberal water intake.   Advised to exercise 30 minutes 4-5 days per week.   Colonoscopy for screening in 2032.   Follow up: 1 year or sooner if needed

## 2023-08-08 ENCOUNTER — TELEPHONE (OUTPATIENT)
Dept: UROLOGY | Facility: CLINIC | Age: 58
End: 2023-08-08
Payer: COMMERCIAL

## 2023-08-08 DIAGNOSIS — N42.9 DISORDER OF PROSTATE: Primary | ICD-10-CM

## 2023-08-08 NOTE — TELEPHONE ENCOUNTER
Called and spoke with patient giving him reminder to get PSA lab done before his appt on 08/14.    TRINA Merlos

## 2023-08-09 ENCOUNTER — LAB (OUTPATIENT)
Dept: LAB | Facility: HOSPITAL | Age: 58
End: 2023-08-09
Payer: COMMERCIAL

## 2023-08-09 DIAGNOSIS — N42.9 DISORDER OF PROSTATE: ICD-10-CM

## 2023-08-09 PROCEDURE — 84153 ASSAY OF PSA TOTAL: CPT

## 2023-08-09 PROCEDURE — 84154 ASSAY OF PSA FREE: CPT

## 2023-08-09 PROCEDURE — 36415 COLL VENOUS BLD VENIPUNCTURE: CPT

## 2023-08-10 LAB
PSA FREE MFR SERPL: 20.8 %
PSA FREE SERPL-MCNC: 0.25 NG/ML
PSA SERPL-MCNC: 1.2 NG/ML (ref 0–4)

## 2023-08-14 ENCOUNTER — OFFICE VISIT (OUTPATIENT)
Dept: UROLOGY | Facility: CLINIC | Age: 58
End: 2023-08-14
Payer: COMMERCIAL

## 2023-08-14 VITALS
SYSTOLIC BLOOD PRESSURE: 128 MMHG | BODY MASS INDEX: 25.67 KG/M2 | TEMPERATURE: 97.9 F | HEIGHT: 74 IN | HEART RATE: 64 BPM | OXYGEN SATURATION: 98 % | WEIGHT: 200 LBS | DIASTOLIC BLOOD PRESSURE: 82 MMHG

## 2023-08-14 DIAGNOSIS — N42.9 DISORDER OF PROSTATE: Primary | ICD-10-CM

## 2023-08-14 LAB
BILIRUB BLD-MCNC: NEGATIVE MG/DL
CLARITY, POC: CLEAR
COLOR UR: YELLOW
EXPIRATION DATE: NORMAL
GLUCOSE UR STRIP-MCNC: NEGATIVE MG/DL
KETONES UR QL: NEGATIVE
LEUKOCYTE EST, POC: NEGATIVE
Lab: NORMAL
NITRITE UR-MCNC: NEGATIVE MG/ML
PH UR: 6 [PH] (ref 5–8)
PROT UR STRIP-MCNC: NEGATIVE MG/DL
RBC # UR STRIP: NEGATIVE /UL
SP GR UR: 1.01 (ref 1–1.03)
UROBILINOGEN UR QL: NORMAL

## 2023-08-14 RX ORDER — AMLODIPINE BESYLATE 10 MG/1
10 TABLET ORAL DAILY
COMMUNITY

## 2023-08-14 NOTE — PROGRESS NOTES
Chief Complaint   Patient presents with    Disorder of prostate    Hydronephrosis        HPI  Mr. Enzweiler is a 58 y.o. male with history of enlarged prostate who presents for follow up.     At this visit, has no urinary concerns. Takes only one medication per day and overall feels well.    IPSS Questionnaire (AUA-7):  Incomplete emptying  Over the past month, how often have you had a sensation of not emptying your bladder completely after you finish?: Not at all (08/14/23 0837)  Frequency  Over the past month, how often have you had to urinate again less than two hours after you finishing urinating ?: Less than 1 time in 5 (08/14/23 0837)  Intermittency  Over the past month, how often have you found you stopped and started again several time when you urinated ?: Not at all (08/14/23 0837)  Urgency  Over the last month, how difficult  have you found it to postpone urination ?: Not at all (08/14/23 0837)  Weak Stream  Over the past month, how often have you had a weak urinary stream ?: Not at all (08/14/23 0837)  Straining  Over the past month, how often have you had to push or strain to begin urination ?: Not at all (08/14/23 0837)  Nocturia  Over the past month, how many times did you most typically get up to urinate from the time you went to bed until the time you got up in the morning ?: Less than 1 time in 5 (08/14/23 0837)  Quality of life due to urinary symptoms  If you were to spend the rest of your life with your urinary condition the way it is now, how would feel about that?: Delighted (08/14/23 0837)    Scores  Total IPSS Score: (!) 2 (08/14/23 0837)  Total Score = Symtomatic Level: Mildly symptomatic: 0-7 (08/14/23 0837)       Past Medical History:   Diagnosis Date    Colon polyp 09/16/2016    Gallstones     GERD (gastroesophageal reflux disease)     HTN (hypertension)     Hx of echocardiogram 2016    Hyperlipidemia        Past Surgical History:   Procedure Laterality Date    CHOLECYSTECTOMY  10/2021     "COLONOSCOPY  09/16/2016    COLONOSCOPY N/A 3/31/2022    Procedure: COLONOSCOPY WITH POLYPECTOMY X1;  Surgeon: Carlos Shore MD;  Location: [facility] ENDOSCOPY;  Service: Gastroenterology;  Laterality: N/A;    ENDOSCOPY N/A 3/31/2022    Procedure: ESOPHAGOGASTRODUODENOSCOPY WITH BIOPSIES;  Surgeon: Carlos Shore MD;  Location: [facility] ENDOSCOPY;  Service: Gastroenterology;  Laterality: N/A;    KNEE SURGERY Right 1979    MANDIBLE SURGERY  2006    TONSILLECTOMY  1986         Current Outpatient Medications:     aspirin 325 MG tablet, Take 1 tablet by mouth As Needed for Mild Pain., Disp: , Rfl:     losartan (COZAAR) 50 MG tablet, Take 1 tablet by mouth Daily., Disp: , Rfl:      Physical Exam  Visit Vitals  /82 (BP Location: Left arm, Patient Position: Sitting, Cuff Size: Adult)   Pulse 64   Temp 97.9 øF (36.6 øC) (Temporal)   Ht 188 cm (74\")   Wt 90.7 kg (200 lb)   SpO2 98%   BMI 25.68 kg/mý       Labs  Brief Urine Lab Results  (Last result in the past 365 days)        Color   Clarity   Blood   Leuk Est   Nitrite   Protein   CREAT   Urine HCG        08/14/23 0850 Yellow   Clear   Negative   Negative   Negative   Negative                   Lab Results   Component Value Date    GLUCOSE 102 (H) 03/19/2022    CALCIUM 10.3 03/19/2022     03/19/2022    K 3.7 03/19/2022    CO2 27.5 03/19/2022     03/19/2022    BUN 11 03/19/2022    CREATININE 0.92 03/19/2022    EGFRIFNONA 86 10/27/2021    BCR 12.0 03/19/2022    ANIONGAP 11.5 03/19/2022       Lab Results   Component Value Date    WBC 6.10 03/19/2022    HGB 14.5 03/19/2022    HCT 42.0 03/19/2022    MCV 86.6 03/19/2022     03/19/2022            Lab Results   Component Value Date    PSA 1.2 08/09/2023    PSA 1.1 07/11/2022       PVR  Post-void residual performed with ultrasound scanner by staff and interpreted by me - 0cc    I have reviewed above labs and imaging.     Assessment  58 y.o. male with history of enlarged prostate.    Over the " course of the year, his symptoms have continued to be minimal with an IPSS of only 2.  His PSA continues to be quite low with a percent free PSA reflecting 10% lifetime risk of any form of prostate cancer.  His urine is completely negative and his PVR is 0.    As above, denies any urinary concerns, denies any actual health concerns or concerns about testosterone.    Plan  1.  Follow-up in 1 year for annual exam with PSA prior

## 2023-09-11 ENCOUNTER — OFFICE VISIT (OUTPATIENT)
Dept: GASTROENTEROLOGY | Facility: CLINIC | Age: 58
End: 2023-09-11
Payer: COMMERCIAL

## 2023-09-11 VITALS
HEART RATE: 82 BPM | TEMPERATURE: 98 F | WEIGHT: 207 LBS | HEIGHT: 74 IN | DIASTOLIC BLOOD PRESSURE: 82 MMHG | SYSTOLIC BLOOD PRESSURE: 112 MMHG | RESPIRATION RATE: 18 BRPM | OXYGEN SATURATION: 97 % | BODY MASS INDEX: 26.56 KG/M2

## 2023-09-11 DIAGNOSIS — E66.3 OVERWEIGHT WITH BODY MASS INDEX (BMI) OF 26 TO 26.9 IN ADULT: Chronic | ICD-10-CM

## 2023-09-11 DIAGNOSIS — Z12.11 ENCOUNTER FOR SCREENING FOR MALIGNANT NEOPLASM OF COLON: ICD-10-CM

## 2023-09-11 DIAGNOSIS — R14.0 BLOATING: Chronic | ICD-10-CM

## 2023-09-11 DIAGNOSIS — K21.9 GASTROESOPHAGEAL REFLUX DISEASE WITHOUT ESOPHAGITIS: Chronic | ICD-10-CM

## 2023-09-11 DIAGNOSIS — K76.0 HEPATIC STEATOSIS: Chronic | ICD-10-CM

## 2023-09-11 DIAGNOSIS — R10.13 EPIGASTRIC PAIN: Primary | Chronic | ICD-10-CM

## 2023-09-11 DIAGNOSIS — R10.31 RIGHT LOWER QUADRANT ABDOMINAL PAIN: Chronic | ICD-10-CM

## 2023-09-11 PROCEDURE — 99214 OFFICE O/P EST MOD 30 MIN: CPT | Performed by: NURSE PRACTITIONER

## 2023-09-11 NOTE — PROGRESS NOTES
Follow Up Note     Date: 2023   Patient Name: Benedict A Enzweiler  MRN: [id number]  : 1965     Primary Care Provider: Whitney Soto MD     Chief Complaint   Patient presents with    GI Problem     1 YEAR FOLLOWUP     History of present illness:   2023  Benedict A Enzweiler is a 58 y.o. male who is here today for follow up for abdominal pain and nausea. He has significantly felt better. He has not had any further episodes of abdominal pain, bloating or nausea. He has not had any reflux. He has stopped taking his PPI as he has not needed it. Denies constipation or diarrhea. Denies any GI bleeding. He has upcoming appointment with his PCP for yearly check up and will have labs drawn at that time.     Interval History:  2022  Benedict A Enzweiler is a 57 y.o. male who is here today for follow up for abdominal pain.     He has been doing better. The pain and bloating have improved since cutting out dairy and gluten. Reflux well controlled with high dose PPI. Denies GI bleeding.      10/25/2021  The patient denies constipation, diarrhea or rectal bleeding. He has a history of epigastric pain off and on for several years that been occurring more frequently than in the past. He has the pain after he eats that will radiate to his back. He occasionally wakes at night due to discomfort in the epigastric area.  Eating fatty foods make his pain worse. No significant nausea.     He has a history of reflux for a few years. Every time he eats he has reflux. He denies reflux at night. He is taking Omeprazole 20 mg daily with reasonable control typically. If reflux is severe, he takes a Pepcid as needed, which helps. No difficulty swallowing.      His last colonoscopy was in 2016 with polyp removed by Dr. Monroe, surgical services. He has not had an EGD in the past. There is no family history of GI malignancy.     Subjective      Past Medical History:   Diagnosis Date    Colon polyp 2016     Gallstones     GERD (gastroesophageal reflux disease)     HTN (hypertension)     Hx of echocardiogram 2016    Hyperlipidemia      Past Surgical History:   Procedure Laterality Date    CHOLECYSTECTOMY  10/2021    COLONOSCOPY  09/16/2016    COLONOSCOPY N/A 3/31/2022    Procedure: COLONOSCOPY WITH POLYPECTOMY X1;  Surgeon: Carlos Shore MD;  Location: [facility] ENDOSCOPY;  Service: Gastroenterology;  Laterality: N/A;    ENDOSCOPY N/A 3/31/2022    Procedure: ESOPHAGOGASTRODUODENOSCOPY WITH BIOPSIES;  Surgeon: Carlos Shore MD;  Location: [facility] ENDOSCOPY;  Service: Gastroenterology;  Laterality: N/A;    KNEE SURGERY Right 1979    MANDIBLE SURGERY  2006    TONSILLECTOMY  1986     Family History   Problem Relation Age of Onset    Cancer Mother         lymphoma    Ulcers Mother     Heart disease Father         MI    Hypertension Sister     Hyperlipidemia Sister     Hypertension Brother     Hyperlipidemia Brother     Stroke Paternal Grandmother     Colon cancer Neg Hx      Social History     Socioeconomic History    Marital status: Single   Tobacco Use    Smoking status: Never    Smokeless tobacco: Never   Vaping Use    Vaping Use: Never used   Substance and Sexual Activity    Alcohol use: Yes     Comment: occ    Drug use: No    Sexual activity: Defer       Current Outpatient Medications:     aspirin 325 MG tablet, Take 1 tablet by mouth As Needed for Mild Pain., Disp: , Rfl:     losartan (COZAAR) 50 MG tablet, Take 0.5 tablets by mouth Daily., Disp: , Rfl:     amLODIPine (NORVASC) 10 MG tablet, Take 1 tablet by mouth Daily. Patient states he only takes half every other due to it makes him dizzy (Patient not taking: Reported on 9/11/2023), Disp: , Rfl:     No Known Allergies    The following portions of the patient's history were reviewed and updated as appropriate: allergies, current medications, past family history, past medical history, past social history, past surgical history and problem  "list.  Objective     Physical Exam  Vitals and nursing note reviewed.   Constitutional:       General: He is not in acute distress.     Appearance: Normal appearance. He is well-developed.   HENT:      Head: Normocephalic and atraumatic.      Mouth/Throat:      Mouth: Mucous membranes are not pale, not dry and not cyanotic.   Eyes:      General: Lids are normal.   Neck:      Trachea: Trachea normal.   Cardiovascular:      Rate and Rhythm: Normal rate.   Pulmonary:      Effort: Pulmonary effort is normal. No respiratory distress.      Breath sounds: Normal breath sounds.   Abdominal:      Tenderness: There is no abdominal tenderness.   Skin:     General: Skin is warm and dry.   Neurological:      Mental Status: He is alert and oriented to person, place, and time.   Psychiatric:         Mood and Affect: Mood normal.         Speech: Speech normal.         Behavior: Behavior normal. Behavior is cooperative.     Vitals:    09/11/23 1324   BP: 112/82   Pulse: 82   Resp: 18   Temp: 98 °F (36.7 °C)   SpO2: 97%   Weight: 93.9 kg (207 lb)   Height: 188 cm (74\")     Body mass index is 26.58 kg/m².     Results Review:   I reviewed the patient's new clinical results.    Office Visit on 08/14/2023   Component Date Value Ref Range Status    Color 08/14/2023 Yellow  Yellow, Straw, Dark Yellow, Lilliam Final    Clarity, UA 08/14/2023 Clear  Clear Final    Specific Gravity  08/14/2023 1.010  1.005 - 1.030 Final    pH, Urine 08/14/2023 6.0  5.0 - 8.0 Final    Leukocytes 08/14/2023 Negative  Negative Final    Nitrite, UA 08/14/2023 Negative  Negative Final    Protein, POC 08/14/2023 Negative  Negative mg/dL Final    Glucose, UA 08/14/2023 Negative  Negative mg/dL Final    Ketones, UA 08/14/2023 Negative  Negative Final    Urobilinogen, UA 08/14/2023 Normal  Normal, 0.2 E.U./dL Final    Bilirubin 08/14/2023 Negative  Negative Final    Blood, UA 08/14/2023 Negative  Negative Final    Lot Number 08/14/2023 [id number]   Final    " Expiration Date 08/14/2023 07/13/2024   Final   Lab on 08/09/2023   Component Date Value Ref Range Status    PSA 08/09/2023 1.2  0.0 - 4.0 ng/mL Final    PSA, Free 08/09/2023 0.25  N/A ng/mL Final    Roche ECLIA methodology.    PSA, Free % 08/09/2023 20.8  % Final     EGD dated 3/31/2022 per Dr. Shore  - Normal oropharynx.  - Z-line regular, 40 cm from the incisors.  - No gross lesions in esophagus. Biopsied.  - Mild Bilious gastric fluid. May have bile gastropathy  - Erythematous mucosa in the posterior wall of the stomach and antrum. Biopsied.  - Normal duodenal bulb, first portion of the duodenum, second portion of the duodenum and third portion of the duodenum.  -Gastric biopsies with mild chronic gastritis with reactive epithelial changes.  No H. pylori.  Distal esophagus biopsies unremarkable.     Colonoscopy dated 3/31/2022 per Dr. Shore  - Preparation of the colon was fair.  - Diverticulosis in the sigmoid colon.  - One 2 to 3 mm polyp in the proximal ascending colon, removed with a cold biopsy forceps. Resected and retrieved.  - External and internal hemorrhoids.  - The examined portion of the ileum was normal.  -Ascending colon polyp biopsy with benign colonic mucosa with prominent lymphoid aggregate.    Assessment / Plan      1. Epigastric pain  2. Bloating  3. Right lower quadrant abdominal pain  4. Gastroesophageal reflux disease without esophagitis  He has not had any further episodes of abdominal pain, nausea, bloating or reflux.  He has cut out dairy and cut back on gluten and his symptoms have significantly improved.  He has not needed to take any medications for reflux.  No difficulty swallowing.  Denies any GI bleeding.  Abdominal ultrasound in 2022 unremarkable.  EGD and colonoscopy in March 2022 unremarkable.  Antireflux measures.  May take Nexium 20 mg daily as needed.  Continue to avoid dairy and cut back on gluten.  He has upcoming PCP appointment with labs.    5. Hepatic steatosis  6.  Overweight with body mass index (BMI) of 26 to 26.9 in adult  BMI 26.58  No history of elevated liver enzymes.  He had mild hepatic steatosis noted on previous imaging.  Advise low-fat diet and exercise.  Maintain ideal body weight.    7. Encounter for screening for malignant neoplasm of colon  Colonoscopy dated 3/31/2022 with 1 polyp removed, lymphoid aggregate.  No family history of colon cancer.  Colonoscopy for screening in 10 years, 2032, per current [facility] guidelines.    Patient Instructions   Antireflux measures: Avoid fried, fatty foods, alcohol, chocolate, coffee, tea,  soft drinks, peppermint and spearmint, spicy foods, tomatoes and tomato based foods, onions, peppers, and smoking.   Other antireflux measures include weight reduction if overweight, avoiding tight clothing around the abdomen, elevating the head of the bed 6 inches with blocks under the head board, and don't drink or eat before going to bed and avoid lying down immediately after meals.  Esomeprazole 20 mg 1 by mouth daily as needed.   The patient should eat 4-5 very small meals throughout the day. Avoid large meals.  Low fiber, low fat diet with liberal water intake. May use soluble fiber such as Metamucil daily.  Advised to exercise 30 minutes 4-5 days per week.   Colonoscopy for screening in 2032.  Follow up: The patient will call back  MIGNON Vaca  9/11/2023    Please note that portions of this note were completed with a voice recognition program.

## 2023-09-11 NOTE — PATIENT INSTRUCTIONS
Antireflux measures: Avoid fried, fatty foods, alcohol, chocolate, coffee, tea,  soft drinks, peppermint and spearmint, spicy foods, tomatoes and tomato based foods, onions, peppers, and smoking.   Other antireflux measures include weight reduction if overweight, avoiding tight clothing around the abdomen, elevating the head of the bed 6 inches with blocks under the head board, and don't drink or eat before going to bed and avoid lying down immediately after meals.  Esomeprazole 20 mg 1 by mouth daily as needed.   The patient should eat 4-5 very small meals throughout the day. Avoid large meals.  Low fiber, low fat diet with liberal water intake. May use soluble fiber such as Metamucil daily.  Advised to exercise 30 minutes 4-5 days per week.   Colonoscopy for screening in 2032.  Follow up: The patient will call back

## 2024-08-09 ENCOUNTER — TELEPHONE (OUTPATIENT)
Dept: UROLOGY | Facility: CLINIC | Age: 59
End: 2024-08-09
Payer: COMMERCIAL

## 2024-08-09 NOTE — TELEPHONE ENCOUNTER
I have left a voicemail yesterday and attempted to call him today about his appointment he has on Monday morning 8/12. He has not gotten his PSA done and I advised that he needs to get that done and reschedule his appointment.  Emiliana Madrigal MA

## 2024-08-12 ENCOUNTER — OFFICE VISIT (OUTPATIENT)
Dept: UROLOGY | Facility: CLINIC | Age: 59
End: 2024-08-12
Payer: COMMERCIAL

## 2024-08-12 ENCOUNTER — LAB (OUTPATIENT)
Dept: LAB | Facility: HOSPITAL | Age: 59
End: 2024-08-12
Payer: COMMERCIAL

## 2024-08-12 VITALS
HEIGHT: 74 IN | OXYGEN SATURATION: 97 % | HEART RATE: 72 BPM | WEIGHT: 207 LBS | BODY MASS INDEX: 26.56 KG/M2 | TEMPERATURE: 98 F | DIASTOLIC BLOOD PRESSURE: 74 MMHG | SYSTOLIC BLOOD PRESSURE: 118 MMHG

## 2024-08-12 DIAGNOSIS — R39.9 LOWER URINARY TRACT SYMPTOMS (LUTS): Primary | ICD-10-CM

## 2024-08-12 DIAGNOSIS — R79.89 LOW TESTOSTERONE IN MALE: ICD-10-CM

## 2024-08-12 DIAGNOSIS — R39.9 LOWER URINARY TRACT SYMPTOMS (LUTS): ICD-10-CM

## 2024-08-12 LAB
BILIRUB BLD-MCNC: ABNORMAL MG/DL
CLARITY, POC: CLEAR
COLOR UR: ABNORMAL
ESTRADIOL SERPL HS-MCNC: 26.7 PG/ML
EXPIRATION DATE: ABNORMAL
GLUCOSE UR STRIP-MCNC: NEGATIVE MG/DL
HCT VFR BLD AUTO: 43.2 % (ref 37.5–51)
HGB BLD-MCNC: 14.5 G/DL (ref 13–17.7)
KETONES UR QL: NEGATIVE
LEUKOCYTE EST, POC: NEGATIVE
LH SERPL-ACNC: 4.8 MIU/ML
Lab: ABNORMAL
NITRITE UR-MCNC: NEGATIVE MG/ML
PH UR: 5.5 [PH] (ref 5–8)
PROT UR STRIP-MCNC: ABNORMAL MG/DL
PSA SERPL-MCNC: 1.1 NG/ML (ref 0–4)
RBC # UR STRIP: ABNORMAL /UL
SP GR UR: 1.03 (ref 1–1.03)
UROBILINOGEN UR QL: ABNORMAL

## 2024-08-12 PROCEDURE — 85014 HEMATOCRIT: CPT

## 2024-08-12 PROCEDURE — 83002 ASSAY OF GONADOTROPIN (LH): CPT

## 2024-08-12 PROCEDURE — 84403 ASSAY OF TOTAL TESTOSTERONE: CPT

## 2024-08-12 PROCEDURE — 51798 US URINE CAPACITY MEASURE: CPT | Performed by: NURSE PRACTITIONER

## 2024-08-12 PROCEDURE — 84153 ASSAY OF PSA TOTAL: CPT

## 2024-08-12 PROCEDURE — 99214 OFFICE O/P EST MOD 30 MIN: CPT | Performed by: NURSE PRACTITIONER

## 2024-08-12 PROCEDURE — 82670 ASSAY OF TOTAL ESTRADIOL: CPT

## 2024-08-12 PROCEDURE — 85018 HEMOGLOBIN: CPT

## 2024-08-12 PROCEDURE — 84402 ASSAY OF FREE TESTOSTERONE: CPT

## 2024-08-12 PROCEDURE — 36415 COLL VENOUS BLD VENIPUNCTURE: CPT

## 2024-08-12 PROCEDURE — 81003 URINALYSIS AUTO W/O SCOPE: CPT | Performed by: NURSE PRACTITIONER

## 2024-08-12 RX ORDER — MELOXICAM 15 MG/1
TABLET ORAL
COMMUNITY
Start: 2024-07-18

## 2024-08-12 NOTE — PROGRESS NOTES
Office Visit Established Male Patient     Patient Name: Benedict A Enzweiler  : 1965   MRN: [id number]     Chief Complaint:   Chief Complaint   Patient presents with    Follow-up     1 year check  No concerns         History of Present Illness: Mr. Benedict A Enzweiler is a 59 y.o. male who presents today for follow up for LUTS.  Patient reports no issues with urinary symptoms.  He does wish to discuss low testosterone.  He has never been on testosterone therapy in the past.      Reports low libido     Reports low energy     Reports poor sleep     Reports mental clarity issues    Reports history of depression    Denies erectile dysfunction   Denies breast tenderness or growth   Denies history of blood clots               Denies history of stroke                       Patient does not have potential interest in conception, no children    IPSS Questionnaire (AUA-7):  Incomplete emptying  Over the past month, how often have you had a sensation of not emptying your bladder completely after you finished urinating?: Not at all (24)  Frequency  Over the past month, how often have you had to urinate again less than two hours after you finishied urinating ?: Less than 1 time in 5 (24)  Intermittency  Over the past month, how often have you found you stopped and started again several time when you urinated ?: Not at all (24)  Urgency  Over the last month, how often have you found it difficult  have you found it difficult to postpone urination ?: Not at all (24)  Weak Stream  Over the past month, how often have you had a weak urinary stream ?: Not at all (24)  Straining  Over the past month, how often have you had to push or strain to begin urination ?: Not at all (24)  Nocturia  Over the past month, how many times did you most typically get up to urinate from the time you went to bed until the time you got up in the morning ?: None (24  0905)  Quality of life due to urinary symptoms  If you were to spend the rest of your life with your urinary condition the way it is now, how would feel about that?: Delighted (08/12/24 0905)    Scores  Total IPSS Score: 1 (08/12/24 0905)  Total Score = Symptomatic Level: Mildly symptomatic: 0-7 (08/12/24 0905)        Subjective      Review of System:   As noted in HPI.    Past Medical History:   Past Medical History:   Diagnosis Date    Colon polyp 09/16/2016    Gallstones     GERD (gastroesophageal reflux disease)     HTN (hypertension)     Hx of echocardiogram 2016    Hyperlipidemia        Past Surgical History:   Past Surgical History:   Procedure Laterality Date    CHOLECYSTECTOMY  10/2021    COLONOSCOPY  09/16/2016    COLONOSCOPY N/A 3/31/2022    Procedure: COLONOSCOPY WITH POLYPECTOMY X1;  Surgeon: Carlos Shore MD;  Location: [facility] ENDOSCOPY;  Service: Gastroenterology;  Laterality: N/A;    ENDOSCOPY N/A 3/31/2022    Procedure: ESOPHAGOGASTRODUODENOSCOPY WITH BIOPSIES;  Surgeon: Carlos Shore MD;  Location: [facility] ENDOSCOPY;  Service: Gastroenterology;  Laterality: N/A;    KNEE SURGERY Right 1979    MANDIBLE SURGERY  2006    TONSILLECTOMY  1986       Family History:   Family History   Problem Relation Age of Onset    Cancer Mother         lymphoma    Ulcers Mother     Heart disease Father         MI    Hypertension Sister     Hyperlipidemia Sister     Hypertension Brother     Hyperlipidemia Brother     Stroke Paternal Grandmother     Colon cancer Neg Hx        Social History:   Social History     Socioeconomic History    Marital status: Single   Tobacco Use    Smoking status: Never     Passive exposure: Never    Smokeless tobacco: Never   Vaping Use    Vaping status: Never Used   Substance and Sexual Activity    Alcohol use: Yes     Comment: occ    Drug use: No    Sexual activity: Defer       Medications:     Current Outpatient Medications:     aspirin 325 MG tablet, Take 1 tablet by mouth  "As Needed for Mild Pain., Disp: , Rfl:     losartan (COZAAR) 50 MG tablet, Take 0.5 tablets by mouth Daily., Disp: , Rfl:     meloxicam (MOBIC) 15 MG tablet, , Disp: , Rfl:     Allergies:   No Known Allergies    Objective     Physical Exam:   Vital Signs:   Vitals:    08/12/24 0902   BP: 118/74   Pulse: 72   Temp: 98 °F (36.7 °C)   SpO2: 97%   Weight: 93.9 kg (207 lb)   Height: 188 cm (74.02\")     Body mass index is 26.57 kg/m².   Physical Exam  Vitals and nursing note reviewed.   Constitutional:       General: He is awake. He is not in acute distress.     Appearance: He is not ill-appearing.   Pulmonary:      Effort: Pulmonary effort is normal.   Skin:     General: Skin is warm and dry.   Neurological:      Mental Status: He is alert and oriented to person, place, and time. Mental status is at baseline.   Psychiatric:         Mood and Affect: Mood normal.         Behavior: Behavior is cooperative.            Labs  Brief Urine Lab Results  (Last result in the past 365 days)        Color   Clarity   Blood   Leuk Est   Nitrite   Protein   CREAT   Urine HCG        08/12/24 0901 Dark Yellow   Clear   Trace   Negative   Negative   30 mg/dL                   Lab Results   Component Value Date    GLUCOSE 102 (H) 03/19/2022    CALCIUM 10.3 03/19/2022     03/19/2022    K 3.7 03/19/2022    CO2 27.5 03/19/2022     03/19/2022    BUN 11 03/19/2022    CREATININE 0.92 03/19/2022    EGFRIFNONA 86 10/27/2021    BCR 12.0 03/19/2022    ANIONGAP 11.5 03/19/2022       Lab Results   Component Value Date    WBC 6.10 03/19/2022    HGB 14.5 03/19/2022    HCT 42.0 03/19/2022    MCV 86.6 03/19/2022     03/19/2022            Lab Results   Component Value Date    PSA 1.2 08/09/2023       Office Visit on 08/12/2024   Component Date Value Ref Range Status    Color 08/12/2024 Dark Yellow  Yellow, Straw, Dark Yellow, Lilliam Final    Clarity, UA 08/12/2024 Clear  Clear Final    Specific Gravity  08/12/2024 1.030  1.005 - 1.030 " Final    pH, Urine 08/12/2024 5.5  5.0 - 8.0 Final    Leukocytes 08/12/2024 Negative  Negative Final    Nitrite, UA 08/12/2024 Negative  Negative Final    Protein, POC 08/12/2024 30 mg/dL (A)  Negative mg/dL Final    Glucose, UA 08/12/2024 Negative  Negative mg/dL Final    Ketones, UA 08/12/2024 Negative  Negative Final    Urobilinogen, UA 08/12/2024 0.2 E.U./dL  Normal, 0.2 E.U./dL Final    Bilirubin 08/12/2024 Small (1+) (A)  Negative Final    Blood, UA 08/12/2024 Trace (A)  Negative Final    Lot Number 08/12/2024 [id number]   Final    Expiration Date 08/12/2024 05/31/25   Final   Office Visit on 08/14/2023   Component Date Value Ref Range Status    Color 08/14/2023 Yellow  Yellow, Straw, Dark Yellow, Lilliam Final    Clarity, UA 08/14/2023 Clear  Clear Final    Specific Gravity  08/14/2023 1.010  1.005 - 1.030 Final    pH, Urine 08/14/2023 6.0  5.0 - 8.0 Final    Leukocytes 08/14/2023 Negative  Negative Final    Nitrite, UA 08/14/2023 Negative  Negative Final    Protein, POC 08/14/2023 Negative  Negative mg/dL Final    Glucose, UA 08/14/2023 Negative  Negative mg/dL Final    Ketones, UA 08/14/2023 Negative  Negative Final    Urobilinogen, UA 08/14/2023 Normal  Normal, 0.2 E.U./dL Final    Bilirubin 08/14/2023 Negative  Negative Final    Blood, UA 08/14/2023 Negative  Negative Final    Lot Number 08/14/2023 [id number]   Final    Expiration Date 08/14/2023 07/13/2024   Final       Radiographic Studies  No Images in the past 120 days found..    I have reviewed the above labs and imaging.     PVR  Post-void residual performed with ultrasound by staff and interpreted by me - 0 mL     Assessment / Plan      Assessment:   Diagnoses and all orders for this visit:    1. Lower urinary tract symptoms (LUTS) (Primary)  -     POC Urinalysis Dipstick, Automated  -     PSA DIAGNOSTIC; Future  -     PSA Diagnostic; Future    2. Low testosterone in male  -     PSA DIAGNOSTIC; Future  -     Testosterone, Free, Total; Future  -      Hemoglobin & Hematocrit, Blood; Future  -     Estradiol; Future  -     Luteinizing Hormone; Future         59 y.o. male presents for follow up for LUTS.  He is not having any urinary symptoms with IPSS 1, PVR 0 mL.  Did not obtain PSA prior to visit.  Will obtain today.       Today we discussed the role testosterone plays for a male patient. I have indicated that low testosterone can be associated with metabolic syndrome, erectile dysfunction, coronary artery disease, diabetes, depression, osteoporosis, fatigue, low sex drive, poor sleep, high cholesterol, increased abdominal fat, muscle loss, irritability, hot flashes, and inability to concentrate.     Treatment options were discussed including SERMs, aromatase inhibitors, topical gel or patch, oral troches, nasal spray, testosterone injections as frequent as weekly, long-acting injections every 10 weeks, and testosterone pellets placed in clinic every 4-6 months.    We discussed testosterone therapy is a life long therapy and compliance with labs and visits are required for refills and patient safety.  Educated patient will get 1 month supply and will call for refills.  Patient given TRT policy and agreed to follow policy.  We discussed the decreased fertility risk with proceeding with testosterone.      I explained the risks, benefits, and alternatives to testosterone therapies. I informed him of the potential SEs of chest and leg swelling, increased acne, change in mood, polycythemia, and worsening of undiagnosed prostate cancer.  Discussed treatment options for polycythemia.  Discussed we will need two sets of labs prior to 10 am for diagnosis of hypogonadism.  He wishes to obtain labs today and think about starting TRT in the future.  He was given educational sheet on low testosterone from [facility].      Plan:    Obtain PSA, LH, H/H, FT, TT, and estradiol today  Will contact patient via [employer] to discuss if he wishes to move forward with repeat labs and  treatment.  Repeat PSA in 1 year  At this time we will follow up in 1 year with repeat PSA, IPSS, and PVR.     Follow Up:   Return in about 1 year (around 8/12/2025) for recheck with Kristin, with IPSS, with PVR, labs prior.      MIGNON Delacruz  [facility]

## 2024-08-14 LAB
TESTOST FREE SERPL-MCNC: 5.5 PG/ML (ref 6.6–18.1)
TESTOST SERPL-MCNC: 217 NG/DL (ref 264–916)

## 2025-05-23 ENCOUNTER — TRANSCRIBE ORDERS (OUTPATIENT)
Dept: GENERAL RADIOLOGY | Facility: HOSPITAL | Age: 60
End: 2025-05-23
Payer: COMMERCIAL

## 2025-05-23 ENCOUNTER — HOSPITAL ENCOUNTER (OUTPATIENT)
Dept: GENERAL RADIOLOGY | Facility: HOSPITAL | Age: 60
Discharge: HOME | End: 2025-05-23
Admitting: FAMILY MEDICINE
Payer: COMMERCIAL

## 2025-05-23 DIAGNOSIS — M54.31 RIGHT SIDED SCIATICA: Primary | ICD-10-CM

## 2025-05-23 DIAGNOSIS — M54.31 RIGHT SIDED SCIATICA: ICD-10-CM

## 2025-05-23 DIAGNOSIS — M54.32 LEFT SIDED SCIATICA: ICD-10-CM

## 2025-05-23 PROCEDURE — 72100 X-RAY EXAM L-S SPINE 2/3 VWS: CPT

## (undated) DEVICE — LUBE JELLY PK/2.75GM STRL BX/144

## (undated) DEVICE — CONMED SCOPE SAVER BITE BLOCK, 20X27 MM: Brand: SCOPE SAVER

## (undated) DEVICE — Device

## (undated) DEVICE — ENDOSCOPY PORT CONNECTOR FOR OLYMPUS® SCOPES: Brand: ERBE

## (undated) DEVICE — HYBRID TUBING/CAP SET FOR OLYMPUS® SCOPES: Brand: ERBE

## (undated) DEVICE — VLV SXN AIR/H2O ORCAPOD3 1P/U STRL

## (undated) DEVICE — FRCP BX RADJAW4 NDL 2.8 240 STD OG

## (undated) DEVICE — SUCTION CANISTER, 1500CC, RIGID: Brand: DEROYAL